# Patient Record
Sex: FEMALE | Employment: UNEMPLOYED | URBAN - METROPOLITAN AREA
[De-identification: names, ages, dates, MRNs, and addresses within clinical notes are randomized per-mention and may not be internally consistent; named-entity substitution may affect disease eponyms.]

---

## 2017-03-22 ENCOUNTER — HOSPITAL ENCOUNTER (INPATIENT)
Facility: HOSPITAL | Age: 47
LOS: 5 days | Discharge: PSYCHIATRIC OR SUBSTANCE USE FACILITY | DRG: 917 | End: 2017-03-27
Attending: INTERNAL MEDICINE | Admitting: INTERNAL MEDICINE
Payer: MEDICAID

## 2017-03-22 DIAGNOSIS — G93.40 ACUTE ENCEPHALOPATHY: Primary | ICD-10-CM

## 2017-03-22 DIAGNOSIS — R41.82 ALTERED MENTAL STATUS: ICD-10-CM

## 2017-03-22 DIAGNOSIS — R40.2430 GLASGOW COMA SCALE TOTAL SCORE 3-8, UNSPECIFIED TIME: ICD-10-CM

## 2017-03-22 LAB
ALBUMIN SERPL BCP-MCNC: 3 G/DL
ALLENS TEST: ABNORMAL
ALP SERPL-CCNC: 58 U/L
ALT SERPL W/O P-5'-P-CCNC: 10 U/L
AMMONIA PLAS-SCNC: 20 UMOL/L
ANION GAP SERPL CALC-SCNC: 10 MMOL/L
AST SERPL-CCNC: 14 U/L
BACTERIA #/AREA URNS HPF: ABNORMAL /HPF
BASOPHILS # BLD AUTO: 0.01 K/UL
BASOPHILS NFR BLD: 0.1 %
BILIRUB SERPL-MCNC: 1.1 MG/DL
BILIRUB UR QL STRIP: NEGATIVE
BUN SERPL-MCNC: 6 MG/DL
CALCIUM SERPL-MCNC: 8 MG/DL
CHLORIDE SERPL-SCNC: 105 MMOL/L
CLARITY UR: ABNORMAL
CO2 SERPL-SCNC: 19 MMOL/L
COLOR UR: YELLOW
CREAT SERPL-MCNC: 0.9 MG/DL
DELSYS: ABNORMAL
DIFFERENTIAL METHOD: ABNORMAL
EOSINOPHIL # BLD AUTO: 0 K/UL
EOSINOPHIL NFR BLD: 0 %
ERYTHROCYTE [DISTWIDTH] IN BLOOD BY AUTOMATED COUNT: 12.4 %
ERYTHROCYTE [SEDIMENTATION RATE] IN BLOOD BY WESTERGREN METHOD: 16 MM/H
EST. GFR  (AFRICAN AMERICAN): >60 ML/MIN/1.73 M^2
EST. GFR  (NON AFRICAN AMERICAN): >60 ML/MIN/1.73 M^2
FIO2: 100
GLUCOSE SERPL-MCNC: 232 MG/DL
GLUCOSE UR QL STRIP: ABNORMAL
HCO3 UR-SCNC: 25 MMOL/L (ref 24–28)
HCT VFR BLD AUTO: 34.5 %
HGB BLD-MCNC: 11.8 G/DL
HGB UR QL STRIP: ABNORMAL
KETONES UR QL STRIP: ABNORMAL
LEUKOCYTE ESTERASE UR QL STRIP: NEGATIVE
LYMPHOCYTES # BLD AUTO: 0.6 K/UL
LYMPHOCYTES NFR BLD: 5.7 %
MCH RBC QN AUTO: 30.1 PG
MCHC RBC AUTO-ENTMCNC: 34.2 %
MCV RBC AUTO: 88 FL
MICROSCOPIC COMMENT: ABNORMAL
MIN VOL: 8.59
MODE: ABNORMAL
MONOCYTES # BLD AUTO: 0.2 K/UL
MONOCYTES NFR BLD: 2.2 %
NEUTROPHILS # BLD AUTO: 9.7 K/UL
NEUTROPHILS NFR BLD: 91.7 %
NITRITE UR QL STRIP: NEGATIVE
PCO2 BLDA: 41.2 MMHG (ref 35–45)
PEEP: 5
PH SMN: 7.39 [PH] (ref 7.35–7.45)
PH UR STRIP: 6 [PH] (ref 5–8)
PIP: 32
PLATELET # BLD AUTO: 235 K/UL
PMV BLD AUTO: 10.2 FL
PO2 BLDA: 548 MMHG (ref 80–100)
POC BE: 0 MMOL/L
POC SATURATED O2: 100 % (ref 95–100)
POC TCO2: 26 MMOL/L (ref 23–27)
POTASSIUM SERPL-SCNC: 3.7 MMOL/L
PROT SERPL-MCNC: 6.5 G/DL
PROT UR QL STRIP: ABNORMAL
RBC # BLD AUTO: 3.92 M/UL
RBC #/AREA URNS HPF: 10 /HPF (ref 0–4)
SAMPLE: ABNORMAL
SITE: ABNORMAL
SODIUM SERPL-SCNC: 134 MMOL/L
SP GR UR STRIP: >=1.03 (ref 1–1.03)
SP02: 100
TROPONIN I SERPL DL<=0.01 NG/ML-MCNC: <0.006 NG/ML
TSH SERPL DL<=0.005 MIU/L-ACNC: 0.83 UIU/ML
URATE CRY URNS QL MICRO: ABNORMAL
URN SPEC COLLECT METH UR: ABNORMAL
UROBILINOGEN UR STRIP-ACNC: 1 EU/DL
VALPROATE SERPL-MCNC: <12.5 UG/ML
VT: 450
WBC # BLD AUTO: 10.57 K/UL
YEAST URNS QL MICRO: ABNORMAL

## 2017-03-22 PROCEDURE — 25000003 PHARM REV CODE 250: Performed by: STUDENT IN AN ORGANIZED HEALTH CARE EDUCATION/TRAINING PROGRAM

## 2017-03-22 PROCEDURE — 87040 BLOOD CULTURE FOR BACTERIA: CPT | Mod: 59

## 2017-03-22 PROCEDURE — 80183 DRUG SCRN QUANT OXCARBAZEPIN: CPT

## 2017-03-22 PROCEDURE — 27000221 HC OXYGEN, UP TO 24 HOURS

## 2017-03-22 PROCEDURE — 36600 WITHDRAWAL OF ARTERIAL BLOOD: CPT

## 2017-03-22 PROCEDURE — 94002 VENT MGMT INPAT INIT DAY: CPT

## 2017-03-22 PROCEDURE — 80164 ASSAY DIPROPYLACETIC ACD TOT: CPT

## 2017-03-22 PROCEDURE — 80053 COMPREHEN METABOLIC PANEL: CPT

## 2017-03-22 PROCEDURE — 84443 ASSAY THYROID STIM HORMONE: CPT

## 2017-03-22 PROCEDURE — 85025 COMPLETE CBC W/AUTO DIFF WBC: CPT

## 2017-03-22 PROCEDURE — 25000003 PHARM REV CODE 250: Performed by: HOSPITALIST

## 2017-03-22 PROCEDURE — 87086 URINE CULTURE/COLONY COUNT: CPT

## 2017-03-22 PROCEDURE — 84484 ASSAY OF TROPONIN QUANT: CPT

## 2017-03-22 PROCEDURE — 99900035 HC TECH TIME PER 15 MIN (STAT)

## 2017-03-22 PROCEDURE — 86703 HIV-1/HIV-2 1 RESULT ANTBDY: CPT

## 2017-03-22 PROCEDURE — 81000 URINALYSIS NONAUTO W/SCOPE: CPT

## 2017-03-22 PROCEDURE — 11000001 HC ACUTE MED/SURG PRIVATE ROOM

## 2017-03-22 PROCEDURE — 36415 COLL VENOUS BLD VENIPUNCTURE: CPT

## 2017-03-22 PROCEDURE — 94761 N-INVAS EAR/PLS OXIMETRY MLT: CPT

## 2017-03-22 PROCEDURE — 25000003 PHARM REV CODE 250: Performed by: INTERNAL MEDICINE

## 2017-03-22 PROCEDURE — 82803 BLOOD GASES ANY COMBINATION: CPT

## 2017-03-22 PROCEDURE — 86592 SYPHILIS TEST NON-TREP QUAL: CPT

## 2017-03-22 PROCEDURE — 93005 ELECTROCARDIOGRAM TRACING: CPT

## 2017-03-22 PROCEDURE — 82140 ASSAY OF AMMONIA: CPT

## 2017-03-22 PROCEDURE — 27200966 HC CLOSED SUCTION SYSTEM

## 2017-03-22 RX ORDER — IBUPROFEN 200 MG
24 TABLET ORAL
Status: DISCONTINUED | OUTPATIENT
Start: 2017-03-22 | End: 2017-03-28 | Stop reason: HOSPADM

## 2017-03-22 RX ORDER — GLUCAGON 1 MG
1 KIT INJECTION
Status: DISCONTINUED | OUTPATIENT
Start: 2017-03-22 | End: 2017-03-28 | Stop reason: HOSPADM

## 2017-03-22 RX ORDER — HEPARIN SODIUM 5000 [USP'U]/ML
5000 INJECTION, SOLUTION INTRAVENOUS; SUBCUTANEOUS EVERY 8 HOURS
Status: DISCONTINUED | OUTPATIENT
Start: 2017-03-22 | End: 2017-03-24

## 2017-03-22 RX ORDER — CHLORHEXIDINE GLUCONATE ORAL RINSE 1.2 MG/ML
15 SOLUTION DENTAL 2 TIMES DAILY
Status: DISCONTINUED | OUTPATIENT
Start: 2017-03-22 | End: 2017-03-28 | Stop reason: HOSPADM

## 2017-03-22 RX ORDER — IBUPROFEN 200 MG
16 TABLET ORAL
Status: DISCONTINUED | OUTPATIENT
Start: 2017-03-22 | End: 2017-03-28 | Stop reason: HOSPADM

## 2017-03-22 RX ORDER — SODIUM CHLORIDE 450 MG/100ML
INJECTION, SOLUTION INTRAVENOUS CONTINUOUS
Status: DISCONTINUED | OUTPATIENT
Start: 2017-03-22 | End: 2017-03-25

## 2017-03-22 RX ADMIN — SODIUM CHLORIDE: 0.45 INJECTION, SOLUTION INTRAVENOUS at 11:03

## 2017-03-22 RX ADMIN — CHLORHEXIDINE GLUCONATE 15 ML: 1.2 RINSE ORAL at 09:03

## 2017-03-22 RX ADMIN — HEPARIN SODIUM 5000 UNITS: 5000 INJECTION, SOLUTION INTRAVENOUS; SUBCUTANEOUS at 09:03

## 2017-03-23 LAB
ALBUMIN SERPL BCP-MCNC: 2.8 G/DL
ALLENS TEST: ABNORMAL
ALP SERPL-CCNC: 51 U/L
ALT SERPL W/O P-5'-P-CCNC: 10 U/L
ANION GAP SERPL CALC-SCNC: 8 MMOL/L
AST SERPL-CCNC: 13 U/L
BASOPHILS # BLD AUTO: 0.01 K/UL
BASOPHILS NFR BLD: 0.1 %
BILIRUB SERPL-MCNC: 0.9 MG/DL
BUN SERPL-MCNC: 5 MG/DL
CALCIUM SERPL-MCNC: 8.2 MG/DL
CHLORIDE SERPL-SCNC: 106 MMOL/L
CK SERPL-CCNC: 111 U/L
CO2 SERPL-SCNC: 22 MMOL/L
CREAT SERPL-MCNC: 0.7 MG/DL
DELSYS: ABNORMAL
DIFFERENTIAL METHOD: ABNORMAL
EOSINOPHIL # BLD AUTO: 0 K/UL
EOSINOPHIL NFR BLD: 0.1 %
ERYTHROCYTE [DISTWIDTH] IN BLOOD BY AUTOMATED COUNT: 12.4 %
ERYTHROCYTE [SEDIMENTATION RATE] IN BLOOD BY WESTERGREN METHOD: 16 MM/H
EST. GFR  (AFRICAN AMERICAN): >60 ML/MIN/1.73 M^2
EST. GFR  (NON AFRICAN AMERICAN): >60 ML/MIN/1.73 M^2
FIO2: 40
GLUCOSE SERPL-MCNC: 118 MG/DL
HCO3 UR-SCNC: 23.1 MMOL/L (ref 24–28)
HCT VFR BLD AUTO: 32 %
HGB BLD-MCNC: 10.9 G/DL
HIV 1+2 AB+HIV1 P24 AG SERPL QL IA: NEGATIVE
LYMPHOCYTES # BLD AUTO: 0.7 K/UL
LYMPHOCYTES NFR BLD: 5.9 %
MCH RBC QN AUTO: 30.2 PG
MCHC RBC AUTO-ENTMCNC: 34.1 %
MCV RBC AUTO: 89 FL
MIN VOL: 8.37
MODE: ABNORMAL
MONOCYTES # BLD AUTO: 1.2 K/UL
MONOCYTES NFR BLD: 10.4 %
NEUTROPHILS # BLD AUTO: 9.5 K/UL
NEUTROPHILS NFR BLD: 83.2 %
PCO2 BLDA: 37.1 MMHG (ref 35–45)
PEEP: 5
PH SMN: 7.4 [PH] (ref 7.35–7.45)
PIP: 28
PLATELET # BLD AUTO: 221 K/UL
PMV BLD AUTO: 9.8 FL
PO2 BLDA: 172 MMHG (ref 80–100)
POC BE: -2 MMOL/L
POC SATURATED O2: 100 % (ref 95–100)
POC TCO2: 24 MMOL/L (ref 23–27)
POCT GLUCOSE: 128 MG/DL (ref 70–110)
POTASSIUM SERPL-SCNC: 3.8 MMOL/L
PROT SERPL-MCNC: 6 G/DL
RBC # BLD AUTO: 3.61 M/UL
RPR SER QL: NORMAL
SAMPLE: ABNORMAL
SITE: ABNORMAL
SODIUM SERPL-SCNC: 136 MMOL/L
SP02: 100
VT: 400
WBC # BLD AUTO: 11.46 K/UL

## 2017-03-23 PROCEDURE — 95822 EEG COMA OR SLEEP ONLY: CPT

## 2017-03-23 PROCEDURE — 99900035 HC TECH TIME PER 15 MIN (STAT)

## 2017-03-23 PROCEDURE — 85025 COMPLETE CBC W/AUTO DIFF WBC: CPT

## 2017-03-23 PROCEDURE — 36600 WITHDRAWAL OF ARTERIAL BLOOD: CPT

## 2017-03-23 PROCEDURE — 87070 CULTURE OTHR SPECIMN AEROBIC: CPT

## 2017-03-23 PROCEDURE — 63600175 PHARM REV CODE 636 W HCPCS: Performed by: STUDENT IN AN ORGANIZED HEALTH CARE EDUCATION/TRAINING PROGRAM

## 2017-03-23 PROCEDURE — 93005 ELECTROCARDIOGRAM TRACING: CPT

## 2017-03-23 PROCEDURE — 87205 SMEAR GRAM STAIN: CPT

## 2017-03-23 PROCEDURE — 82550 ASSAY OF CK (CPK): CPT

## 2017-03-23 PROCEDURE — 80053 COMPREHEN METABOLIC PANEL: CPT

## 2017-03-23 PROCEDURE — 11000001 HC ACUTE MED/SURG PRIVATE ROOM

## 2017-03-23 PROCEDURE — 82693 ASSAY OF ETHYLENE GLYCOL: CPT

## 2017-03-23 PROCEDURE — 80320 DRUG SCREEN QUANTALCOHOLS: CPT

## 2017-03-23 PROCEDURE — 25000003 PHARM REV CODE 250: Performed by: INTERNAL MEDICINE

## 2017-03-23 PROCEDURE — 82803 BLOOD GASES ANY COMBINATION: CPT

## 2017-03-23 PROCEDURE — 25000003 PHARM REV CODE 250: Performed by: HOSPITALIST

## 2017-03-23 PROCEDURE — 95819 EEG AWAKE AND ASLEEP: CPT | Mod: 26,,, | Performed by: PSYCHIATRY & NEUROLOGY

## 2017-03-23 PROCEDURE — 25000003 PHARM REV CODE 250: Performed by: STUDENT IN AN ORGANIZED HEALTH CARE EDUCATION/TRAINING PROGRAM

## 2017-03-23 PROCEDURE — 36415 COLL VENOUS BLD VENIPUNCTURE: CPT

## 2017-03-23 RX ORDER — HALOPERIDOL 5 MG/ML
2.5 INJECTION INTRAMUSCULAR EVERY 4 HOURS PRN
Status: DISCONTINUED | OUTPATIENT
Start: 2017-03-23 | End: 2017-03-25

## 2017-03-23 RX ORDER — FENTANYL CITRATE 50 UG/ML
25 INJECTION, SOLUTION INTRAMUSCULAR; INTRAVENOUS ONCE
Status: COMPLETED | OUTPATIENT
Start: 2017-03-23 | End: 2017-03-23

## 2017-03-23 RX ORDER — FAMOTIDINE 10 MG/ML
20 INJECTION INTRAVENOUS 2 TIMES DAILY
Status: DISCONTINUED | OUTPATIENT
Start: 2017-03-23 | End: 2017-03-28 | Stop reason: HOSPADM

## 2017-03-23 RX ORDER — FENTANYL CITRATE 50 UG/ML
INJECTION, SOLUTION INTRAMUSCULAR; INTRAVENOUS
Status: DISPENSED
Start: 2017-03-23 | End: 2017-03-24

## 2017-03-23 RX ADMIN — HEPARIN SODIUM 5000 UNITS: 5000 INJECTION, SOLUTION INTRAVENOUS; SUBCUTANEOUS at 03:03

## 2017-03-23 RX ADMIN — SODIUM CHLORIDE: 0.45 INJECTION, SOLUTION INTRAVENOUS at 03:03

## 2017-03-23 RX ADMIN — FAMOTIDINE 20 MG: 10 INJECTION, SOLUTION INTRAVENOUS at 09:03

## 2017-03-23 RX ADMIN — FAMOTIDINE 20 MG: 10 INJECTION, SOLUTION INTRAVENOUS at 10:03

## 2017-03-23 RX ADMIN — HEPARIN SODIUM 5000 UNITS: 5000 INJECTION, SOLUTION INTRAVENOUS; SUBCUTANEOUS at 05:03

## 2017-03-23 RX ADMIN — FENTANYL CITRATE 25 MCG: 50 INJECTION, SOLUTION INTRAMUSCULAR; INTRAVENOUS at 01:03

## 2017-03-23 RX ADMIN — CHLORHEXIDINE GLUCONATE 15 ML: 1.2 RINSE ORAL at 10:03

## 2017-03-23 RX ADMIN — CHLORHEXIDINE GLUCONATE 15 ML: 1.2 RINSE ORAL at 09:03

## 2017-03-23 NOTE — PLAN OF CARE
"Pt intubated; no family available. Progress notes reviewed  47 y.o. AA female with Crohn's, depression, and drug abuse who was transferred from [facility] ED. History obtained via OSH records. I have been unable to reach family. Pt presented 3/22/17 to above ED at 10:03AM after being found unconscious in bed at home. Family thinks patient may have taken an overdose of depression medication. They had last spoken to patient around 8:17am, but even then pt seemed "a lot different & weak."Apparently, last dose of Invega given 3/10/17, per [facility] (mental health).     [place] to follow.       03/23/17 1109   Discharge Assessment   Assessment Type Discharge Planning Assessment   Confirmed/corrected address and phone number on facesheet? No  (pt intubated; no family at bedside)   Assessment information obtained from? Medical Record   Prior to hospitilization cognitive status: Alert/Oriented   Prior to hospitalization functional status: Independent   Current cognitive status: Coma/Sedated/Intubated   Current Functional Status: Completely Dependent   Arrived From acute care hospital  (transfer from [facility])   Able to Return to Prior Arrangements unable to determine at this time (comments)   Is patient able to care for self after discharge? Unable to determine at this time (comments)   Readmission Within The Last 30 Days unable to assess   Patient currently being followed by outpatient case management? Unable to determine (comments)   Do you have any problems affording any of your prescribed medications? TBD   Are there any open cases? No   Discharge Plan A (TBD)     "

## 2017-03-23 NOTE — CONSULTS
[facility] Neurology Consult Note    Reason for Consult -   Stroke    History of Present Illness -   Patient is a 47 year old woman with PMH of Chrohn's, Depression, Bipolar disorder, Paranoid Schizophrenia, and multiple suicide attempts, and headache who presented to the ED on 3/22 after being found unconscious in bed at home.      In discussing with family, family reports that the patient has history of suicide attempts.  Once took multiple pills and then called a family member.  Later she attempted to walk into the [facility] but was stopped by family members.    On initial exam, patient is initially unresponsive even to noxious stimuli to limbs, but moves her head when corneal reflex is attempted and moves her extremities and opens her eyes when gag and cough are elicited with deep suction.  Patient's nurse also reports that patient would not remain still in imaging until she received a dose of fentanyl.    Review of Systems -  Unable to obtain 2/2 patient presentation    Past Medical History -   Chrohn's, Depression, Bipolar disorder, Paranoid Schizophrenia, and multiple suicide attempts, and headache    Social History -   Social History     Social History    Marital status: Unknown     Spouse name: N/A    Number of children: N/A    Years of education: N/A     Social History Main Topics    Smoking status: Not on file    Smokeless tobacco: Not on file    Alcohol use Not on file    Drug use: Not on file    Sexual activity: Not on file     Other Topics Concern    Not on file     Social History Narrative     Family History -   No family history on file.    Allergies - NKDA  Home Neuro Medications -     Vitals -     Vitals:    03/23/17 1400 03/23/17 1415 03/23/17 1430 03/23/17 1521   BP: (!) 142/69      Patient Position: Lying      BP Method: Automatic      Pulse: 104 96 92 109   Resp: (!) 33 17 17 (!) 23   Temp:       TempSrc:       SpO2:  100% 100% 100%   Weight:       Height:           Neurological Exam  -     General appearance: Patient intubated; not on continuous sedation  Facial Expression: normal       Affect: full       Orientation to time & place:  Not participating with exam        Cranial nerves:  pupils equal round and reactive, corneals intact, oculocephalics intact, gag and eye closure intact    Musculoskeletal:  Muscle tone: all 4 extremities normal        Muscle Bulk: all 4 extremities normal        Muscle strength:  5/5 in bilateral upper and lower extremities in proximal and distal flexion and extension; (patient not moving when requested, but moving all extremities on deep suction  Deep tendon Reflexes: 2+ bilateral triceps, biceps, and brachioradialis; 2+ in bilateral patellae. Positive ankle jerks.  Plantar flexor responses bilaterally    Labs -      Recent Labs  Lab 03/22/17  1938 03/23/17  0325   WBC 10.57 11.46   HGB 11.8* 10.9*   HCT 34.5* 32.0*    221   * 136   K 3.7 3.8    106   CO2 19* 22*   BUN 6 5*   * 118*   PROT 6.5 6.0   ALBUMIN 3.0* 2.8*   BILITOT 1.1* 0.9   AST 14 13   ALKPHOS 58 51*   ALT 10 10   TROPONINI <0.006  --    TSH 0.834  --        Imaging -     MRI Brain  Time of Procedure: 03/23/17 12:39:42  Accession # [id number]    Procedure: MRI the brain without contrast.    Technique: Multiplanar, multisequence MRI of the brain was obtained without the use of intravenous contrast.    Comparison: None    Findings: The brain parenchyma is normal in signal and contour. There are no abnormal areas of brain parenchymal signal. There are no areas of restricted diffusion to suggest acute infarction. The ventricles are normal in size and configuration without evidence for hydrocephalus. There are no abnormal areas of the gradient susceptibility to suggest parenchymal hemorrhage. No abnormal intra or extra axial fluid collections. The major intracranial T2  flow-voids are present.    The globes, orbits, pituitary gland, pineal gland, craniocervical junction, and upper  cervical spine demonstrate no significant abnormalities.  The paranasal sinuses and mastoid air cells are clear.   Impression        No evidence of acute infarction, hemorrhage, mass, or hydrocephalus.      Electronically signed by: CRISTO DUTTON MD  Date: 03/23/17  Time: 14:02      EEG  DATE OF STUDY: 03/23/2017     EEG NUMBER: OK-.     REFERRING PHYSICIAN: Dr. Blount.     This EEG was performed to assess for subclinical seizures.     ELECTROENCEPHALOGRAM REPORT     METHODOLOGY: Electroencephalographic (EEG) recording is recorded with   electrodes placed according to the International 10-20 placement system. Thirty  two (32) channels of digital signal (sampling rate of 512/sec), including T1   and T2, were simultaneously recorded from the scalp and may include EKG, EMG,   and/or eye monitors. Recording band pass was 0.1 to 512 Hz. Digital video   recording of the patient is simultaneously recorded with the EEG. The patient   is instructed to report clinical symptoms which may occur during the recording   session. EEG and video recording are stored and archived in digital format.   Activation procedures, which include photic stimulation, hyperventilation and   instructing patients to perform simple tasks, are done in selected patients.     The EEG is displayed on a monitor screen and can be reviewed using different   montages. Computer assisted-analysis is employed to detect spike and   electrographic seizure activity. The entire record is submitted for computer   analysis. The entire recording is visually reviewed, and the times identified   by computer analysis as being spikes or seizures are reviewed again.     Compressed spectral analysis (CSA) is also performed on the activity recorded   from each individual channel. This is displayed as a power display of   frequencies from 0 to 30 Hz over time. The CSA is reviewed looking for   asymmetries in power between homologous areas of the scalp, then compared  with   the original EEG recording.     [employer] software was also utilized in the review of this study. This software   suite analyzes the EEG recording in multiple domains. Coherence and rhythmicity  are computed to identify EEG sections which may contain organized seizures.   Each channel undergoes analysis to detect the presence of spike and sharp waves   which have special and morphological characteristics of epileptic activity. The  routine EEG recording is converted from special into frequency domain. This is  then displayed comparing homologous areas to identify areas of significant   asymmetry. Algorithm to identify non-cortically generated artifact is used to   separate artifact from the EEG.     EEG FINDINGS: The recording was obtained with a number of standard bipolar and   referential montages during lethargic state and sleep. In this state, the   background was diffusely disorganized with a nonsustained posterior dominant   rhythm of 8 Hz, which was symmetric. Wakefulness was predominantly   characterized by an admixture of frequencies. During drowsiness, the background  rhythm waxed and waned and there were periods of slowing. During deeper stages  of clinical sleep, symmetric sleep spindles were noted. There were no   interictal epileptiform abnormalities and no clinical or electrographic seizures  were recorded. Activation procedures were not performed.     The EKG channel revealed an irregular rhythm.     IMPRESSION: This is an abnormal EEG during lethargic state and sleep. Diffuse   disorganized slowing of the background was noted.     CLINICAL CORRELATION: The patient is a 47-year-old female with a history of   multiple medical problems who presented with altered sensorium. She is   currently maintained on Trileptal. This is an abnormal EEG during lethargic   state and sleep. The overall degree of slowing and disorganization for given   age is suggestive of a mild-to-moderate degree of  encephalopathy, nonspecific to  the cause. There is no evidence of an epileptic process on this recording. No  seizures were recorded during this study.        FAK/SN dd: 03/23/2017 10:55:44 (CDT) td: 03/23/2017 11:31:43 (CDT) Doc ID   #[id number] Job ID #[id number]       Assessment and Plan -     Patient is a 47 year old woman with PMH of Chrohn's, Depression, Bipolar disorder, Paranoid Schizophrenia, and multiple suicide attempts, and headache who presented to the ED on 3/22 after being found unconscious in bed at home.    -Hx of suicide attempts including overdose and then calling family member and attempting to walk into [facility] but stopped by family members  -Patient demonstrates intact eyelid strength, pupillary responses, corneal reflexes, gag and cough  -Patient demonstrated volitional actions including attempting to maintain her eyes closed, turning her head to avoid exam, and moving arms and legs following deep suction  -Patient also reportedly demonstrating anxiety and moving when imaging attempted  -No acute abnormalities on MRI  -EEG shows no epileptiform activity; discussed with Dr. Raza; although EEG shows some diffuse slowing, it does not explain patient's presentation  -Per family, no history of seizure; patient possibly on Trileptal for headaches  -Recommend consulting psychiatry  -No further Neurology recommendations    Thank you for the consult.  We will sign off.  Please contact us if we can be of further assistance.  Discussed with Dr. Ry Johnson MD  [facility] Neu[facility]

## 2017-03-23 NOTE — PROGRESS NOTES
Results for SALVATORE HOWELL (MRN [id number]) as of 3/23/2017 05:41   Ref. Range 3/22/2017 20:00   POC PH Latest Ref Range: 7.35 - 7.45  7.391   POC PCO2 Latest Ref Range: 35 - 45 mmHg 41.2   POC PO2 Latest Ref Range: 80 - 100 mmHg 548 (H)   POC BE Latest Ref Range: -2 to 2 mmol/L 0   POC HCO3 Latest Ref Range: 24 - 28 mmol/L 25.0   POC SATURATED O2 Latest Ref Range: 95 - 100 % 100   POC TCO2 Latest Ref Range: 23 - 27 mmol/L 26   FiO2 Unknown 100   Vt Unknown 450   PiP Unknown 32   PEEP Unknown 5   Sample Unknown ARTERIAL   DelSys Unknown Adult Vent   Allens Test Unknown Pass   Site Unknown LR   Mode Unknown AC/PRVC     Results shared with EICU physician.  Ventilator settings provided.

## 2017-03-23 NOTE — H&P
"[facility] Medicine History and Physical - Resident Note    Admitting Team: [facility] Medicine Team B  Attending Physician: Dr Adames  Resident: Dr Jeanette Best  Intern: Dr Paulino Baker  Date of Admit: 3/22/2017    Chief Complaint     AMS x 1 d    Subjective:      History of Present Illness:  Silvana Lopes is a 47 y.o. AA female with Crohn's, depression, and drug abuse who was transferred from [facility] ED. History obtained via OSH records. I have been unable to reach family. Pt presented 3/22/17 to above ED at 10:03AM after being found unconscious in bed at home. Family thinks patient may have taken an overdose of depression medication. They had last spoken to patient around 8:17am, but even then pt seemed "a lot different & weak."    Given Narcan 2 mg via EMS without response. Her ED triage vitals were temp 97.4, /85 (BP as low as 90/56, which improved), , RR 12, 99% on RA.  She received 3 boluses of 1 liter NS in OSH ED, and started on D51/2NS at 125 ml/hr. Pupils reportedly pinpoint. GCS 7 (2 eye opening to pain, 1 non-verbal, 4 withdraws). Just prior to transport here, she began vomiting, so she was intubated for airway protection.    Apparently, last dose of Invega given 3/10/17, per [facility] (mental health).     Past Medical History:  As above     Past Surgical History:  Unknown     Allergies:  Unknown     Home Medications:  Imuran 50mg daily  Remicade  mg - dose unknown   Metoprolol succinate 25 mg daily  Zyprexa 15 mg qhs  Trileptal 300 mg BID  Invega Sustenna 156 mg/ml IM every 4 weeks - dose unknown    Family History:  Unknown     Social History:  Unknown     Review of Systems:  Unable to assess due to AMS    Health Maintaince :   Primary Care Physician: Gilmar Blanco  Immunizations:   Unknown   Cancer Screening:  Unknown      Objective:   Last 24 Hour Vital Signs:  BP  Min: 127/72  Max: 127/72  Pulse  Av  Min: 83  Max: 88  Resp  Av.3  Min: 16  " Max: 17  SpO2  Av %  Min: 100 %  Max: 100 %  There is no height or weight on file to calculate BMI.       Physical Examination:  GEN: intubated, moves extremities with stimulation but does not open eyes  HEENT: NCAT, PERRL, ETT in place  CV: RRR, no m/r  RESP: CTAB anterior fields, vent sounds, intubated  ABD: soft, NTND, normoactive BS, no organomegaly  EXTR: no c/c/e, 2+ distal pulses x 4  NEURO: PERRL, not alert (not on sedation), moves all extremities with stimulation but does not open eyes, does not follow commands  SKIN: no rashes, lesions, or color changes       Laboratory:  OSH results  WBC 7.5 (81% segs, 9% lymphs, 8% monos, 0 bands)  H/H 13.5/40.4    Gluc 188  BUN 5  Cr 1  Na 136  K 3.1  Cl 102  HCO3 25  Ca 8.7  ALP, ALT, AST normal  Tbili 1.2  INR 1  UA: clear, 15 ketones, negative for nitrite/leukocytes, 2-4 wbc, 2-4 rbc, few bacteria, no casts  Tylenol level negative  EtOH negative  Salicylate negative  Troponin negative  Tox screen negative  ABG: ph 7.42 / pco2 34 / po2 93 / bicarb 22 / o2 sat 97% (on room air)    Other Results:  EKG (my interpretation): NSR, no acute changes    Radiology:  OSH CT head - no bleed, per report  CXR - negative     Assessment:     Silvana Lopes is a 47 y.o. female with:  Patient Active Problem List    Diagnosis Date Noted    Acute encephalopathy 2017        Plan:     Acute encephalopathy  -GCS 7, etiology unclear at this time. Concern for psychiatric medication overdose. No obvious evidence of metabolic or infectious causes at this time. OSH CT head without bleed. Tox screen, Tylenol, and salicylate level negative at OSH.   -poison control center contacted by OSH, recommended supportive care  -will pursue infectious workup, check ammonia, order brain MRI, consult psychiatry & neurology, check oxcarbazepine level, get hourly neuro checks, order stat EEG  -intubated for airway protection, not requiring any sedation. was never in respiratory distress.  pulm consult for vent management. abg pending, will adjust settings accordingly     Hx of drug abuse  -unknown which drugs patient abuses  -tox screen at OSH was negative    Crohn's  -holding home medications for now    Psychiatric history  -diagnosis unknown  -holding home zyprexa, last invega injection 3/10/17  -psych consult    Diet-NPO  PPX-heparin  Dispo-intubated, continue ICU care    Family Contact:  Latha Gonzalez (sister) - [phone]  Home phone - [phone]    Code Status:     Full     Jeanette Best  [facility] Internal Medicine HO-III  [facility] Medicine Service B  [facility] Medicine Hospitalist Pager numbers:   [facility] Hospitalist Medicine Team A (Cyndy/Cristobal): [phone]  [facility] Hospitalist Medicine Team B (Farzana/Zacarias):  [phone]

## 2017-03-23 NOTE — PLAN OF CARE
Problem: Patient Care Overview  Goal: Plan of Care Review  Patient admitted to ICU intubated and unconscious.  ETTube 7.0 @ 25 cm right lip.  Will continue to monitor.

## 2017-03-23 NOTE — PROGRESS NOTES
Results for SALVATORE HOWELL (MRN [id number]) as of 3/23/2017 05:41   Ref. Range 3/23/2017 05:09   POC PH Latest Ref Range: 7.35 - 7.45  7.401   POC PCO2 Latest Ref Range: 35 - 45 mmHg 37.1   POC PO2 Latest Ref Range: 80 - 100 mmHg 172 (H)   POC BE Latest Ref Range: -2 to 2 mmol/L -2   POC HCO3 Latest Ref Range: 24 - 28 mmol/L 23.1 (L)   POC SATURATED O2 Latest Ref Range: 95 - 100 % 100   POC TCO2 Latest Ref Range: 23 - 27 mmol/L 24   FiO2 Unknown 40   Vt Unknown 400   PiP Unknown 28   PEEP Unknown 5   Sample Unknown ARTERIAL   DelSys Unknown Adult Vent   Allens Test Unknown Pass   Site Unknown LR   Mode Unknown AC/PRVC       Results given to nurse.

## 2017-03-23 NOTE — PROGRESS NOTES
"[facility] Hospital Medicine Progress Note    Subjective:      Remains intubated. No improvement in mental status. Moving extremities with stimulation but does not open eyes or follow commands.      Objective:   Last 24 Hour Vital Signs:  BP  Min: 107/60  Max: 178/79  Temp  Av.7 °F (36.5 °C)  Min: 97.5 °F (36.4 °C)  Max: 97.8 °F (36.6 °C)  Pulse  Av.8  Min: 71  Max: 110  Resp  Av.6  Min: 15  Max: 57  SpO2  Av %  Min: 98 %  Max: 100 %  Height  Av' 4" (162.6 cm)  Min: 5' 4" (162.6 cm)  Max: 5' 4" (162.6 cm)  Weight  Av kg (174 lb 0.9 oz)  Min: 78.7 kg (173 lb 8 oz)  Max: 79.2 kg (174 lb 9.7 oz)  Body mass index is 29.97 kg/(m^2).  I/O last 3 completed shifts:  In: 486.3 [I.V.:486.3]  Out: 415 [Urine:415]    Physical Examination:  GEN: intubated, moves extremities with stimulation but does not open eyes  HEENT: NCAT, PERRL, ETT in place  CV: RRR, no m/r  RESP: CTAB anterior fields, vent sounds, intubated  ABD: soft, NTND, normoactive BS, no organomegaly  EXTR: no c/c/e, 2+ distal pulses x 4  NEURO: PERRL, not alert (not on sedation), moves all extremities with stimulation but does not open eyes, does not follow commands  SKIN: no rashes, lesions, or color changes       Laboratory Results     WBC 11  Cr 0.7  ABG reviewed  RPR negative  HIV pending  Oxcarbazepine level pending      Other Results:  EKG (my interpretation): NSR, no acute changes    Radiology:  OSH CT head - no bleed, per report  CXR - negative     Assessment:     Silvana Lopes is a 47 y.o. female with:  Patient Active Problem List    Diagnosis Date Noted    Acute encephalopathy 2017        Plan:     Acute encephalopathy  -GCS 7, etiology unclear at this time, without improvement this AM. Concern for psychiatric medication overdose. No obvious evidence of metabolic or infectious causes at this time. OSH CT head x 2 without bleed. Tox screen, Tylenol, and salicylate level negative at OSH. Ammonia level normal. EEG w/o seizure " activity  -poison control center contacted by OSH, recommended supportive care  -will pursue infectious workup, order brain MRI, consult psychiatry & neurology, check oxcarbazepine level, get hourly neuro checks, check volatiles  -intubated for airway protection, not requiring any sedation. was never in respiratory distress. pulm consult for vent management    Crohn's  -holding home medications for now    Bipolar disorder/schizophrenia  -holding home zyprexa, last invega injection 3/10/17  -psych consult    Diet-NPO  PPX-heparin  Dispo-intubated, continue ICU care    Family Contact:  Latha Gonzalez (sister) - [phone]  Home phone - [phone]  Fiorella (sister) - [phone]    Code Status:     Elizabeth Best  [facility] Internal Medicine HO-III  [facility] Medicine Service B  [facility] Medicine Hospitalist Pager numbers:   [facility] Hospitalist Medicine Team A (Cyndy/Cristobal): [phone]  [facility] Hospitalist Medicine Team B (Farzana/Zacarias):  [phone]

## 2017-03-23 NOTE — PLAN OF CARE
[facility] Internal Medicine    Spoke with Poison Control who are familiar with patient and current condition. Per recommendations EKG, CK ordered. MRI pending. oxcarbazepine level pending. EEG ordered. Per poison control lower suspicion for overdose, suspect underlying medical etiology. Recommend supportive care at this time, follow up on pending studies.    Lenard Yañez MD PGY1

## 2017-03-23 NOTE — PLAN OF CARE
Notified Dr. Adames's team about not able to do STAT EEG here, if need STAT EEG, need to transfer pt to main campus.

## 2017-03-23 NOTE — PLAN OF CARE
Problem: Patient Care Overview  Goal: Plan of Care Review  Outcome: Ongoing (interventions implemented as appropriate)  EEG monitoring and MRI performed today. Results unremarkable and show no unusual neuro activity. Neurology consulted. Pt reflexes intact. See note for further details. Changes in vital signs when patient agitated and stimulated. HR >120. RR> 20. Psych consulted. Pt opens eyes spontaneously and moves all extremities purposefully, but not appropriately following commands. MIVF currently infusing. Pt and family updated on POC. Safety measures maintained.     Problem: Fall Risk (Adult)  Goal: Absence of Falls  Patient will demonstrate the desired outcomes by discharge/transition of care.   Outcome: Ongoing (interventions implemented as appropriate)  Pt reamains free of falls. Safety measures maintained. Hourly rounding performed. Room near unit station.     Problem: Pressure Ulcer Risk (Moris Scale) (Adult,Obstetrics,Pediatric)  Goal: Skin Integrity  Patient will demonstrate the desired outcomes by discharge/transition of care.   Outcome: Ongoing (interventions implemented as appropriate)  Skin remains intact. Pt repositioned Q2. Pressure points protected. Devices and tubing remain free from under patient.     Problem: Infection, Risk/Actual (Adult)  Goal: Infection Prevention/Resolution  Patient will demonstrate the desired outcomes by discharge/transition of care.   Outcome: Ongoing (interventions implemented as appropriate)  Pt remains free of infection. Labs monitored daily.     Problem: Pain, Acute (Adult)  Goal: Acceptable Pain Control/Comfort Level  Patient will demonstrate the desired outcomes by discharge/transition of care.   Outcome: Ongoing (interventions implemented as appropriate)  Pain assessments performed Q2. Pain medication administered one time dose for extreme agitation during procedure.  Will assess and intervene as needed.

## 2017-03-23 NOTE — PLAN OF CARE
Notified Dr. Adames's team about pt urine out put 30-40 ml/hr. Pt on continuous 1/2NS at 75 ml/hr.

## 2017-03-23 NOTE — PHYSICIAN QUERY
PT Name: Silvana Lopes  MR #: [id number]    Physician Query Form - Neurological Condition Clarification       CDS/: Shannan Funes               Contact information: [phone]    This form is a permanent document in the medical record.     Query Date: March 23, 2017    By submitting this query, we are merely seeking further clarification of documentation. Please utilize your independent clinical judgment when addressing the question(s) below.    The Medical record contains the following:   Indicators   Supporting Clinical Findings Location in Medical Record   X AMS, Confusion, LOC, etc. AMS x 1 d H&P    Acute / Chronic Illness depression, and drug abuse who was transferred  H&P    Radiology Findings      Electrolyte Imbalance     X Medication She received 3 boluses of 1 liter NS in OSH ED, and started on D51/2NS at 125 ml/hr. H&P    Treatment     X Other Acute encephalopathy H&P     Provider, please specify the diagnosis or diagnoses associated with above clinical findings.    [  ] Toxic Encephalopathy    [  ] Other Encephalopathy    [ x ] Unspecified Encephalopathy    [  ] Other (please specify): _____________________________________    [  ] Clinically Undetermined      Please document in your progress notes daily for the duration of treatment until resolved, and  include in your discharge summary.

## 2017-03-23 NOTE — PLAN OF CARE
Pt admitted to ICU from EMS transport. VSS. Dr. Adames's team notified of patient's arrival. Will continue to monitor.

## 2017-03-23 NOTE — PROCEDURES
DATE OF STUDY:  03/23/2017    EEG NUMBER:  OK-.    REFERRING PHYSICIAN:  Dr. Blount.    This EEG was performed to assess for subclinical seizures.    ELECTROENCEPHALOGRAM REPORT    METHODOLOGY:  Electroencephalographic (EEG) recording is recorded with   electrodes placed according to the International 10-20 placement system.  Thirty   two (32) channels of digital signal (sampling rate of 512/sec), including T1   and T2, were simultaneously recorded from the scalp and may include EKG, EMG,   and/or eye monitors.  Recording band pass was 0.1 to 512 Hz.  Digital video   recording of the patient is simultaneously recorded with the EEG.  The patient   is instructed to report clinical symptoms which may occur during the recording   session.  EEG and video recording are stored and archived in digital format.    Activation procedures, which include photic stimulation, hyperventilation and   instructing patients to perform simple tasks, are done in selected patients.    The EEG is displayed on a monitor screen and can be reviewed using different   montages.  Computer assisted-analysis is employed to detect spike and   electrographic seizure activity.  The entire record is submitted for computer   analysis.  The entire recording is visually reviewed, and the times identified   by computer analysis as being spikes or seizures are reviewed again.    Compressed spectral analysis (CSA) is also performed on the activity recorded   from each individual channel.  This is displayed as a power display of   frequencies from 0 to 30 Hz over time.  The CSA is reviewed looking for   asymmetries in power between homologous areas of the scalp, then compared with   the original EEG recording.    [employer] software was also utilized in the review of this study.  This software   suite analyzes the EEG recording in multiple domains.  Coherence and rhythmicity   are computed to identify EEG sections which may contain organized seizures.     Each channel undergoes analysis to detect the presence of spike and sharp waves   which have special and morphological characteristics of epileptic activity.  The   routine EEG recording is converted from special into frequency domain.  This is   then displayed comparing homologous areas to identify areas of significant   asymmetry.  Algorithm to identify non-cortically generated artifact is used to   separate artifact from the EEG.    EEG FINDINGS:  The recording was obtained with a number of standard bipolar and   referential montages during lethargic state and sleep.  In this state, the   background was diffusely disorganized with a nonsustained posterior dominant   rhythm of 8 Hz, which was symmetric.  Wakefulness was predominantly   characterized by an admixture of frequencies.  During drowsiness, the background   rhythm waxed and waned and there were periods of slowing.  During deeper stages   of clinical sleep, symmetric sleep spindles were noted.  There were no   interictal epileptiform abnormalities and no clinical or electrographic seizures   were recorded.  Activation procedures were not performed.    The EKG channel revealed an irregular rhythm.    IMPRESSION:  This is an abnormal EEG during lethargic state and sleep.  Diffuse   disorganized slowing of the background was noted.    CLINICAL CORRELATION:  The patient is a 47-year-old female with a history of   multiple medical problems who presented with altered sensorium.  She is   currently maintained on Trileptal.  This is an abnormal EEG during lethargic   state and sleep.  The overall degree of slowing and disorganization for given   age is suggestive of a mild-to-moderate degree of encephalopathy, nonspecific to   the cause.  There is no evidence of an epileptic process on this recording.  No   seizures were recorded during this study.      FAK/SN  dd: 03/23/2017 10:55:44 (CDT)  td: 03/23/2017 11:31:43 (CDT)  Doc ID   #[id number]  Job ID  #[id number]    CC:

## 2017-03-23 NOTE — CONSULTS
Consult Note  [facility]    SUBJECTIVE     Reason for consult: Mechanical ventilation management     History of Present Illness  History mainly obtained from chart review and sisters (Latha/Rosanna) as patient is intubated. Patient is a 46 yo woman with PMH of Crohn's, depression with h/o SI, Bipolar/Schizo, drug abuse who was transferred from [facility] ED for AMS. Per family, 4 months ago, patient was complaining of headache and generalized weakness which worsened in the last 2 months associated with anorexia. On Tuesday, she had URI symptoms and visited the ED in [facility] in  and was given fluids and discharged from ED without abx. Yesterday around 8AM patient called family to report about headache and feeling week. Family came around 10-11AM and found patient laying unconscious in bed and called EMS, with bottles of Zyprexa/oxacarbazapine bottles filled on 3/6 empty. Patient denies any recent signs of SI from patient but reports she had past SI of jumping into river and intermittent expression of SI. Per sisters, patient recently started on Remicade  2 weeks ago and was noticing low BP with SBP in 70s. On EMS to ED in [facility], she was given narcan x2 without response but reportedly had pinpoint pupils, GCS 7, given 3 bolus of 1L NS and swicthed to D5 1/2NS. Before transport, patient vomitted and was intubated for airway protection. Patient was transferred to [facility].     Patient has been afebrile, -160/60-70, GCS 5, intubated, CBC/C MP unremarkable, ammonia WNL, trop neg, EKG without acute ischemic changes, ABG 7.391/41/548 on 100% FiO2, CXR without acute abnl, MRI and EEG pending. Pulm was consulted for mechanical ventilation management.       PMH as above    No past surgical history on file.    No family history on file.    Social History     Social History    Marital status: Unknown     Spouse name: N/A    Number of children: N/A    Years of education: N/A     Social History Main  Topics    Smoking status: Not on file    Smokeless tobacco: Not on file    Alcohol use Not on file    Drug use: Not on file    Sexual activity: Not on file     Other Topics Concern    Not on file     Social History Narrative       Current Facility-Administered Medications   Medication Dose Route Frequency Provider Last Rate Last Dose    0.45% NaCl infusion   Intravenous Continuous Lenard Yañez MD 75 mL/hr at 03/22/17 2331      chlorhexidine 0.12 % solution 15 mL  15 mL Mouth/Throat BID ANALI Rothman MD   15 mL at 03/23/17 0924    dextrose 50% injection 12.5 g  12.5 g Intravenous PRN Jeanette Best MD        dextrose 50% injection 25 g  25 g Intravenous PRN Jeanette Best MD        famotidine (PF) 20 mg/2 mL injection 20 mg  20 mg Intravenous BID Garry Vargas MD   20 mg at 03/23/17 0924    glucagon (human recombinant) injection 1 mg  1 mg Intramuscular PRN Jeanette Best MD        glucose chewable tablet 16 g  16 g Oral PRN Jeanette Best MD        glucose chewable tablet 24 g  24 g Oral PRN Jeanette Best MD        heparin (porcine) injection 5,000 Units  5,000 Units Subcutaneous Q8H Jeanette Best MD   5,000 Units at 03/23/17 0557       Review of patient's allergies indicates:  Not on File      Review of Systems  As per HPI, otherwise negative.     OBJECTIVE:     Vital Signs (Most Recent)  Vitals:    03/23/17 1000   BP: (!) 151/73   Pulse: 91   Resp: (!) 21   Temp:        Physical Exam  GEN: intubated, responsive to pain stimuli  HENT: NCAT, PERRLA, ET tube in place, no LAD  Lungs: mechanical breath sounds  CV: RRR,  No murmurs/gallops appreciated  Abd: soft, non-distended, active BS  Extremities: no cyanosis/edema   Skin: no active lesions/rashes/urticaria   Neuro: response to sternal rub, moving all extremities when stimulated, non-responsive to voice, no clonus, limited neuro exam    Laboratory  No results for input(s): INR in the last 168 hours.    Recent Labs  Lab 03/22/17  9284  03/23/17  0325   WBC 10.57 11.46   HGB 11.8* 10.9*   HCT 34.5* 32.0*    221     Lab Results   Component Value Date    TSH 0.834 03/22/2017     Recent Labs  Lab 03/22/17 1938 03/23/17  0325   * 136   K 3.7 3.8    106   CO2 19* 22*   BUN 6 5*   CREATININE 0.9 0.7   CALCIUM 8.0* 8.2*   PROT 6.5 6.0   BILITOT 1.1* 0.9   ALKPHOS 58 51*   ALT 10 10   AST 14 13     Recent Labs  Lab 03/22/17 1938   TROPONINI <0.006   ABG  No results found for: HGBA1C    Recent Labs  Lab 03/23/17  0509   PH 7.401   PO2 172*   PCO2 37.1   HCO3 23.1*   BE -2         Diagnostic Results  Reviewed     Assessment     Patient is a 48 yo woman with PMH of Crohn's, depression with h/o SI, Bipolar/Schizo, drug abuse who was transferred from [facility] ED for AMS, suspected for psychiatric OD per family report. Work up pending. Pulm was consulted for mechanical ventilation management.    Recommendations     Altered Mental Status/Acute encephalopathy  - suspected OD of Zyprexa/oxacarbazapine  oxacarbazapine level pending  - CT head x2 done at [facility] facility without acute abnl per report  - BCx, UCx, Resp Cx pending  - CXR unremarkable, Xray pending  EEG, MRI pending  - ABG 7.391/41/548 on 100% FiO2 450/16/5 -> 7.401/37/172 on 40% FiO2 400/16/5  - recs for repeat tox screen, lipase as patient vomitted x2 in ICU, consider CT abd  - pending work up results, will consider LP after initial work up, no signs of infection currently  - neuro/psych consulted    - will wean O2  currently stable on pressure support 30%, 10/5     Pt seen and examined and plan discussed with staff and fellow.         Thank you for this consult. Pulmonary will continue to follow.     Heladio Campuzano, PGY-I  [facility] - Pulmonary Service Team

## 2017-03-24 LAB
ALBUMIN SERPL BCP-MCNC: 2.7 G/DL
ALLENS TEST: ABNORMAL
ALP SERPL-CCNC: 49 U/L
ALT SERPL W/O P-5'-P-CCNC: 8 U/L
AMPHET+METHAMPHET UR QL: NEGATIVE
ANION GAP SERPL CALC-SCNC: 7 MMOL/L
AST SERPL-CCNC: 12 U/L
BACTERIA UR CULT: NORMAL
BARBITURATES UR QL SCN>200 NG/ML: NEGATIVE
BASOPHILS # BLD AUTO: 0.01 K/UL
BASOPHILS NFR BLD: 0.1 %
BENZODIAZ UR QL SCN>200 NG/ML: NEGATIVE
BILIRUB SERPL-MCNC: 0.6 MG/DL
BUN SERPL-MCNC: 4 MG/DL
BZE UR QL SCN: NEGATIVE
CALCIUM SERPL-MCNC: 8.2 MG/DL
CANNABINOIDS UR QL SCN: NEGATIVE
CHLORIDE SERPL-SCNC: 104 MMOL/L
CO2 SERPL-SCNC: 24 MMOL/L
CREAT SERPL-MCNC: 0.7 MG/DL
CREAT UR-MCNC: 132 MG/DL
DELSYS: ABNORMAL
DIFFERENTIAL METHOD: ABNORMAL
EOSINOPHIL # BLD AUTO: 0 K/UL
EOSINOPHIL NFR BLD: 0.1 %
ERYTHROCYTE [DISTWIDTH] IN BLOOD BY AUTOMATED COUNT: 12.5 %
ERYTHROCYTE [SEDIMENTATION RATE] IN BLOOD BY WESTERGREN METHOD: 16 MM/H
EST. GFR  (AFRICAN AMERICAN): >60 ML/MIN/1.73 M^2
EST. GFR  (NON AFRICAN AMERICAN): >60 ML/MIN/1.73 M^2
ETHANOL UR-MCNC: <10 MG/DL
FIO2: 21
FLOW: 70
GLUCOSE SERPL-MCNC: 130 MG/DL
HCO3 UR-SCNC: 25.8 MMOL/L (ref 24–28)
HCT VFR BLD AUTO: 30.1 %
HCT VFR BLD CALC: 28 %PCV (ref 36–54)
HGB BLD-MCNC: 10 G/DL
HGB BLD-MCNC: 10.3 G/DL
LIPASE SERPL-CCNC: 36 U/L
LYMPHOCYTES # BLD AUTO: 0.7 K/UL
LYMPHOCYTES NFR BLD: 7.5 %
MCH RBC QN AUTO: 30.2 PG
MCHC RBC AUTO-ENTMCNC: 34.2 %
MCV RBC AUTO: 88 FL
METHADONE UR QL SCN>300 NG/ML: NEGATIVE
MODE: ABNORMAL
MONOCYTES # BLD AUTO: 0.5 K/UL
MONOCYTES NFR BLD: 5.3 %
NEUTROPHILS # BLD AUTO: 7.5 K/UL
NEUTROPHILS NFR BLD: 86.8 %
OPIATES UR QL SCN: NEGATIVE
PCO2 BLDA: 41.2 MMHG (ref 35–45)
PCP UR QL SCN>25 NG/ML: NEGATIVE
PH SMN: 7.41 [PH] (ref 7.35–7.45)
PLATELET # BLD AUTO: 218 K/UL
PMV BLD AUTO: 10.1 FL
PO2 BLDA: 94 MMHG (ref 80–100)
POC BE: 1 MMOL/L
POC IONIZED CALCIUM: 1.2 MMOL/L (ref 1.06–1.42)
POC SATURATED O2: 97 % (ref 95–100)
POC TCO2: 27 MMOL/L (ref 23–27)
POTASSIUM BLD-SCNC: 3 MMOL/L (ref 3.5–5.1)
POTASSIUM SERPL-SCNC: 3 MMOL/L
PROT SERPL-MCNC: 5.8 G/DL
RBC # BLD AUTO: 3.41 M/UL
SAMPLE: ABNORMAL
SITE: ABNORMAL
SODIUM BLD-SCNC: 139 MMOL/L (ref 136–145)
SODIUM SERPL-SCNC: 135 MMOL/L
SP02: 100
TOXICOLOGY INFORMATION: NORMAL
WBC # BLD AUTO: 8.65 K/UL

## 2017-03-24 PROCEDURE — 36415 COLL VENOUS BLD VENIPUNCTURE: CPT

## 2017-03-24 PROCEDURE — 11000001 HC ACUTE MED/SURG PRIVATE ROOM

## 2017-03-24 PROCEDURE — 94761 N-INVAS EAR/PLS OXIMETRY MLT: CPT

## 2017-03-24 PROCEDURE — 94003 VENT MGMT INPAT SUBQ DAY: CPT

## 2017-03-24 PROCEDURE — 85025 COMPLETE CBC W/AUTO DIFF WBC: CPT

## 2017-03-24 PROCEDURE — 25000003 PHARM REV CODE 250: Performed by: INTERNAL MEDICINE

## 2017-03-24 PROCEDURE — 25000003 PHARM REV CODE 250: Performed by: HOSPITALIST

## 2017-03-24 PROCEDURE — 99900035 HC TECH TIME PER 15 MIN (STAT)

## 2017-03-24 PROCEDURE — 82803 BLOOD GASES ANY COMBINATION: CPT

## 2017-03-24 PROCEDURE — 25000003 PHARM REV CODE 250: Performed by: STUDENT IN AN ORGANIZED HEALTH CARE EDUCATION/TRAINING PROGRAM

## 2017-03-24 PROCEDURE — 80307 DRUG TEST PRSMV CHEM ANLYZR: CPT

## 2017-03-24 PROCEDURE — 63600175 PHARM REV CODE 636 W HCPCS: Performed by: HOSPITALIST

## 2017-03-24 PROCEDURE — 80053 COMPREHEN METABOLIC PANEL: CPT

## 2017-03-24 PROCEDURE — 36600 WITHDRAWAL OF ARTERIAL BLOOD: CPT

## 2017-03-24 PROCEDURE — 25000003 PHARM REV CODE 250

## 2017-03-24 PROCEDURE — 27000221 HC OXYGEN, UP TO 24 HOURS

## 2017-03-24 PROCEDURE — 83690 ASSAY OF LIPASE: CPT

## 2017-03-24 RX ORDER — POTASSIUM CHLORIDE 7.45 MG/ML
10 INJECTION INTRAVENOUS
Status: COMPLETED | OUTPATIENT
Start: 2017-03-24 | End: 2017-03-24

## 2017-03-24 RX ORDER — LORAZEPAM 1 MG/1
1 TABLET ORAL ONCE
Status: COMPLETED | OUTPATIENT
Start: 2017-03-25 | End: 2017-03-24

## 2017-03-24 RX ORDER — PROPOFOL 10 MG/ML
INJECTION, EMULSION INTRAVENOUS
Status: DISCONTINUED
Start: 2017-03-24 | End: 2017-03-24 | Stop reason: WASHOUT

## 2017-03-24 RX ORDER — DEXMEDETOMIDINE HYDROCHLORIDE 4 UG/ML
0.2 INJECTION, SOLUTION INTRAVENOUS CONTINUOUS
Status: DISCONTINUED | OUTPATIENT
Start: 2017-03-24 | End: 2017-03-24

## 2017-03-24 RX ORDER — DEXMEDETOMIDINE HYDROCHLORIDE 4 UG/ML
INJECTION, SOLUTION INTRAVENOUS
Status: COMPLETED
Start: 2017-03-24 | End: 2017-03-24

## 2017-03-24 RX ORDER — HEPARIN SODIUM 5000 [USP'U]/ML
5000 INJECTION, SOLUTION INTRAVENOUS; SUBCUTANEOUS EVERY 8 HOURS
Status: DISCONTINUED | OUTPATIENT
Start: 2017-03-24 | End: 2017-03-26

## 2017-03-24 RX ORDER — DEXMEDETOMIDINE HYDROCHLORIDE 4 UG/ML
0.2 INJECTION, SOLUTION INTRAVENOUS CONTINUOUS
Status: DISCONTINUED | OUTPATIENT
Start: 2017-03-24 | End: 2017-03-25

## 2017-03-24 RX ADMIN — DEXMEDETOMIDINE HYDROCHLORIDE 0.2 MCG/KG/HR: 4 INJECTION, SOLUTION INTRAVENOUS at 02:03

## 2017-03-24 RX ADMIN — SODIUM CHLORIDE: 0.45 INJECTION, SOLUTION INTRAVENOUS at 11:03

## 2017-03-24 RX ADMIN — HEPARIN SODIUM 5000 UNITS: 5000 INJECTION, SOLUTION INTRAVENOUS; SUBCUTANEOUS at 09:03

## 2017-03-24 RX ADMIN — DEXMEDETOMIDINE HYDROCHLORIDE 0.4 MCG/KG/HR: 4 INJECTION, SOLUTION INTRAVENOUS at 06:03

## 2017-03-24 RX ADMIN — HEPARIN SODIUM 5000 UNITS: 5000 INJECTION, SOLUTION INTRAVENOUS; SUBCUTANEOUS at 10:03

## 2017-03-24 RX ADMIN — CHLORHEXIDINE GLUCONATE 15 ML: 1.2 RINSE ORAL at 09:03

## 2017-03-24 RX ADMIN — FAMOTIDINE 20 MG: 10 INJECTION, SOLUTION INTRAVENOUS at 09:03

## 2017-03-24 RX ADMIN — POTASSIUM CHLORIDE 10 MEQ: 10 INJECTION, SOLUTION INTRAVENOUS at 10:03

## 2017-03-24 RX ADMIN — HEPARIN SODIUM 5000 UNITS: 5000 INJECTION, SOLUTION INTRAVENOUS; SUBCUTANEOUS at 12:03

## 2017-03-24 RX ADMIN — POTASSIUM CHLORIDE 10 MEQ: 10 INJECTION, SOLUTION INTRAVENOUS at 09:03

## 2017-03-24 RX ADMIN — LORAZEPAM 1 MG: 1 TABLET ORAL at 11:03

## 2017-03-24 RX ADMIN — POTASSIUM CHLORIDE 10 MEQ: 10 INJECTION, SOLUTION INTRAVENOUS at 12:03

## 2017-03-24 NOTE — PLAN OF CARE
Problem: Patient Care Overview  Goal: Plan of Care Review  Outcome: Ongoing (interventions implemented as appropriate)  ABCDEF Bundle: Pt is sedated, when awake follows commands. Pt is very agitated when awake. CAM score positive. Pt turning in the bed when prompted. No family at bedside.

## 2017-03-24 NOTE — PROGRESS NOTES
Progress Note  Pulmonary & Critical Care Medicine      SUBJECTIVE:     Reason for consult: b    LOS: 2 days    Interval history  Nurse and on-call team reports patient became combative, pulling tubes and agitated and was placed on Precedex. This AM, patient still very sedated, RASS -5, non-responsive, on S breathing mode since yesterday.     Scheduled Medications   chlorhexidine  15 mL Mouth/Throat BID    famotidine (PF)  20 mg Intravenous BID    heparin (porcine)  5,000 Units Subcutaneous Q8H    potassium chloride  10 mEq Intravenous Q2H       Medications PRN  dextrose 50%, dextrose 50%, glucagon (human recombinant), glucose, glucose, haloperidol lactate      OBJECTIVE:     Temp:  [97.6 °F (36.4 °C)-99.8 °F (37.7 °C)]   Pulse:  []   Resp:  [13-64]   BP: ()/()   SpO2:  [96 %-100 %]     Vitals:    03/24/17 0745   BP: (!) 90/53   Pulse: (!) 57   Resp: 19   Temp:        I & O (Last 24H):  I/O last 3 completed shifts:  In: 1386.3 [I.V.:1386.3]  Out: 1445 [Urine:1445]       Physical Exam:  GEN: intubated, non-responsive to stimuli/sedated, RASS -5  HENT: NCAT, PERRLA, ET tube in place, no LAD  Lungs: clear to auscultation b/l on CPAP mode  CV: RRR, No murmurs/gallops appreciated  Abd: soft, non-distended, active BS  Extremities: no cyanosis/edema   Skin: no active lesions/rashes/urticaria   Neuro:  Non-responsive to stimuli, no clonus, PERRLA, unable to complete full neuro exam     Laboratory:  No results for input(s): INR in the last 168 hours.    Recent Labs  Lab 03/22/17 1938 03/23/17  0325 03/24/17  0310 03/24/17  0443   WBC 10.57 11.46 8.65  --    HGB 11.8* 10.9* 10.3*  --    HCT 34.5* 32.0* 30.1* 28*    221 218  --      Lab Results   Component Value Date    TSH 0.834 03/22/2017     Recent Labs  Lab 03/22/17 1938 03/23/17  0325 03/24/17 0310   * 136 135*   K 3.7 3.8 3.0*    106 104   CO2 19* 22* 24   BUN 6 5* 4*   CREATININE 0.9 0.7 0.7   CALCIUM 8.0* 8.2* 8.2*   PROT 6.5  6.0 5.8*   BILITOT 1.1* 0.9 0.6   ALKPHOS 58 51* 49*   ALT 10 10 8*   AST 14 13 12     No results for input(s): MG in the last 168 hours.  Lab Results   Component Value Date    CALCIUM 8.2 (L) 03/24/2017     No results found for: HGBA1C      Recent Labs  Lab 03/22/17  1938 03/23/17  0325   CPK  --  111   TROPONINI <0.006  --        ABG    Recent Labs  Lab 03/24/17  0443   PH 7.406   PO2 94   PCO2 41.2   HCO3 25.8   BE 1       Diagnostic Results:  Reviewed      Assessment     Patient is a 46 yo woman with PMH of Crohn's, depression with h/o SI, Bipolar/Schizo, drug abuse who was transferred from [facility] ED for AMS, suspected for psychiatric OD per family report. Work up pending. Pulm was consulted for mechanical ventilation management.       Recommendations     Altered Mental Status/Acute encephalopathy  - suspected OD of Zyprexa/oxacarbazapine  oxacarbazapine level pending  - CT head x2 done at [facility] facility without acute abnl per report  - BCx, UCx pending  Resp Cx stain few GPC  - CXR unremarkable, Xray pending  EEG with diffused disorganized slowing of background, no signs of seizure  MRI without acute abnl  XR ab unremarkable, XR skull no acute abnl  - ABG this AM 7.406/41/94 on 21%  - lipase negative  tox screen pending   - work up unrevealing this AM, will consider LP today, no signs of infection currently  - neuro recs: neg MRI, EEG with diffuse slowing, psych consult pending   - Ox sat stable on spontaneous breathing, plan to wean off precedex today and possible LP       Will discuss with staff and fellow.     Heladio Campuzano, PGY-I  [facility] - Pulmonology Service Team

## 2017-03-24 NOTE — PROGRESS NOTES
[facility] Hospital Medicine Progress Note    Subjective:      Remains intubated. Became agitated overnight, but was able to follow commands, attempted to remove restraints, requiring Precedex gtt.      Objective:   Last 24 Hour Vital Signs:  BP  Min: 87/53  Max: 178/79  Temp  Av.8 °F (37.1 °C)  Min: 97.6 °F (36.4 °C)  Max: 99.8 °F (37.7 °C)  Pulse  Av.3  Min: 57  Max: 150  Resp  Av.4  Min: 13  Max: 64  SpO2  Av.5 %  Min: 96 %  Max: 100 %  Body mass index is 29.97 kg/(m^2).  I/O last 3 completed shifts:  In: 1386.3 [I.V.:1386.3]  Out: 1445 [Urine:1445]    Physical Examination:  GEN: intubated, no response but on Precedex  HEENT: NCAT, PERRL, ETT in place  CV: RRR, no m/r  RESP: CTAB anterior fields, vent sounds, intubated  ABD: soft, NTND, normoactive BS, no organomegaly  EXTR: no c/c/e, 2+ distal pulses x 4  NEURO: PERRL, no response but on Precedex  SKIN: no rashes, lesions, or color changes       Laboratory Results   WBC 8  Cr 0.7  ABG reviewed  RPR negative  HIV negative  Oxcarbazepine level pending  K 3  Lipase negative      Other Results:  EKG (my interpretation): NSR, no acute changes    Radiology:  OSH CT head - no bleed, per report  CXR - negative     Assessment:     Silvana Lopes is a 47 y.o. female with:  Patient Active Problem List    Diagnosis Date Noted    Altered mental status     Acute encephalopathy 2017        Plan:     Acute encephalopathy  -GCS 7, etiology unclear at this time. Concern for psychiatric medication overdose (zyprexa, trileptal). No obvious evidence of metabolic or infectious causes at this time. OSH CT head x 2 without bleed. Tox screen, Tylenol, and salicylate level negative at OSH. Ammonia level normal. EEG w/o seizure activity  -poison control center contacted by OSH, recommended supportive care  -infectious workup unremarkable thus far, brain MRI negative, check oxcarbazepine level, check volatiles  -neurology consult: just recommends psych consult,  awaiting recs  -intubated for airway protection. was never in respiratory distress. pulm consult for vent management  -will turn off Precedex and see if improvement in mental status  -consider LP if no improvement    Crohn's  -holding home medications for now    Bipolar disorder/schizophrenia  -holding home zyprexa, last invega injection 3/10/17  -concern for intentional overdose, has had suicide attempts in the past  -psych consult    Diet-NPO  PPX-heparin  Dispo-intubated, continue ICU care    Family Contact:  Latha Gonzalez (sister) - [phone]  Home phone - [phone]  Fiorella (sister) - [phone]    Code Status:     Full     Jeanette Best  [facility] Internal Medicine HO-III  [facility] Hospital Medicine Service B   [facility] Medicine Hospitalist Pager numbers:   [facility] Hospitalist Medicine Team A (Cyndy/Cristobal): [phone]  [facility] Hospitalist Medicine Team B (Farzana/Zacarias):  [phone]

## 2017-03-24 NOTE — PLAN OF CARE
Problem: Patient Care Overview  Goal: Plan of Care Review  Outcome: Ongoing (interventions implemented as appropriate)  Patient understands, and agrees with plan of care. ABCDEF Bundle. Not intubated nor sedated. Breathing on 2 liters nasal canula, respirations stable currently 20, lungs clear pulse ox 99. Cam score negative, patient alert and oriented times 4 and can follow commands. Patient can move independently in bed, and move all extremities. No signs of skin breakdown. Patient remain free from fall in injury throughout the shift. Family at bedside and involved with patient care. Patient denies pain throughout the shift. Patient remained NPO and voided Approprietly each hour. No BM. Side rails up times 2, call light in reach, will continue to monitor.

## 2017-03-24 NOTE — PROGRESS NOTES
Results for SALVATORE HOWELL (MRN [id number]) as of 3/24/2017 05:00   Ref. Range 3/24/2017 04:43   POC Sodium Latest Ref Range: 136 - 145 mmol/L 139   POC Potassium Latest Ref Range: 3.5 - 5.1 mmol/L 3.0 (L)   POC Ionized Calcium Latest Ref Range: 1.06 - 1.42 mmol/L 1.20   POC Hematocrit Latest Ref Range: 36 - 54 %PCV 28 (L)   POC HEMOGLOBIN Latest Units: g/dL 10   POC PH Latest Ref Range: 7.35 - 7.45  7.406   POC PCO2 Latest Ref Range: 35 - 45 mmHg 41.2   POC PO2 Latest Ref Range: 80 - 100 mmHg 94   POC BE Latest Ref Range: -2 to 2 mmol/L 1   POC HCO3 Latest Ref Range: 24 - 28 mmol/L 25.8   POC SATURATED O2 Latest Ref Range: 95 - 100 % 97   POC TCO2 Latest Ref Range: 23 - 27 mmol/L 27   FiO2 Unknown 21   Flow Unknown 70   Sample Unknown ARTERIAL   DelSys Unknown Adult Vent   Allens Test Unknown Pass   Site Unknown LR   Mode Unknown SPONT       Results communicated to nurse.

## 2017-03-24 NOTE — PROGRESS NOTES
"Pt extubated without complication at 1520. She had no immediate changes in her normal vital signs, no desaturations, and no signs of aspiration or difficulty breathing. She was able to follow commands and softly say "I'm okay" and that her throat is sore.   Recommend titrating O2 for goal SpO2 > 90% and speech eval for swallow function.   Also recommend evaluation for PEC given that this may have been a suicide attempt.   Hopefully now that she is able to speak, she can provide more insight into the cause of her illness.   Thank you for this consult.   Garry Vargas, PGY-4  Pulmonary & Critical Care Medicine  pager [phone]    "

## 2017-03-24 NOTE — PLAN OF CARE
Problem: Patient Care Overview  Goal: Plan of Care Review  Outcome: Ongoing (interventions implemented as appropriate)  Pt. Received on vent with settings as charted alarms on and working     ambu bag and mask at bedside   Will cont to maintin airway and vent

## 2017-03-24 NOTE — PROGRESS NOTES
Notified Dr. Shetty of elevated heart rate. MD stated he would call back after speaking to his upper level.

## 2017-03-24 NOTE — PLAN OF CARE
Problem: Patient Care Overview  Goal: Plan of Care Review  Ventilator settings were as noted in flowsheets.  Will continue to monitor.

## 2017-03-24 NOTE — NURSING
[facility]acarias team notified about patient multiple attempts to get out of bed and seeing a man walk around the room, no new orders, will continue to monitor.

## 2017-03-25 PROBLEM — R41.82 ALTERED MENTAL STATUS: Status: ACTIVE | Noted: 2017-03-25

## 2017-03-25 LAB
ALBUMIN SERPL BCP-MCNC: 2.8 G/DL
ALP SERPL-CCNC: 57 U/L
ALT SERPL W/O P-5'-P-CCNC: 11 U/L
ANION GAP SERPL CALC-SCNC: 10 MMOL/L
ANION GAP SERPL CALC-SCNC: 9 MMOL/L
AST SERPL-CCNC: 15 U/L
BACTERIA SPEC AEROBE CULT: NORMAL
BASOPHILS # BLD AUTO: 0.02 K/UL
BASOPHILS NFR BLD: 0.2 %
BILIRUB SERPL-MCNC: 0.6 MG/DL
BUN SERPL-MCNC: 7 MG/DL
BUN SERPL-MCNC: 7 MG/DL
CALCIUM SERPL-MCNC: 8.3 MG/DL
CALCIUM SERPL-MCNC: 8.6 MG/DL
CHLORIDE SERPL-SCNC: 107 MMOL/L
CHLORIDE SERPL-SCNC: 107 MMOL/L
CO2 SERPL-SCNC: 23 MMOL/L
CO2 SERPL-SCNC: 24 MMOL/L
CREAT SERPL-MCNC: 0.8 MG/DL
CREAT SERPL-MCNC: 0.8 MG/DL
DIFFERENTIAL METHOD: ABNORMAL
EOSINOPHIL # BLD AUTO: 0 K/UL
EOSINOPHIL NFR BLD: 0.3 %
ERYTHROCYTE [DISTWIDTH] IN BLOOD BY AUTOMATED COUNT: 12.5 %
EST. GFR  (AFRICAN AMERICAN): >60 ML/MIN/1.73 M^2
EST. GFR  (AFRICAN AMERICAN): >60 ML/MIN/1.73 M^2
EST. GFR  (NON AFRICAN AMERICAN): >60 ML/MIN/1.73 M^2
EST. GFR  (NON AFRICAN AMERICAN): >60 ML/MIN/1.73 M^2
GLUCOSE SERPL-MCNC: 76 MG/DL
GLUCOSE SERPL-MCNC: 95 MG/DL
GRAM STN SPEC: NORMAL
HCT VFR BLD AUTO: 33.4 %
HGB BLD-MCNC: 11.2 G/DL
LYMPHOCYTES # BLD AUTO: 0.8 K/UL
LYMPHOCYTES NFR BLD: 8.2 %
MAGNESIUM SERPL-MCNC: 1.7 MG/DL
MCH RBC QN AUTO: 29.7 PG
MCHC RBC AUTO-ENTMCNC: 33.5 %
MCV RBC AUTO: 89 FL
MONOCYTES # BLD AUTO: 1.2 K/UL
MONOCYTES NFR BLD: 12.5 %
NEUTROPHILS # BLD AUTO: 7.4 K/UL
NEUTROPHILS NFR BLD: 78.7 %
OXCARBAZEPINE METABOLITE: 60 MCG/ML
PLATELET # BLD AUTO: 238 K/UL
PMV BLD AUTO: 9.9 FL
POTASSIUM SERPL-SCNC: 3.3 MMOL/L
POTASSIUM SERPL-SCNC: 3.4 MMOL/L
PROT SERPL-MCNC: 6.5 G/DL
RBC # BLD AUTO: 3.77 M/UL
SODIUM SERPL-SCNC: 140 MMOL/L
SODIUM SERPL-SCNC: 140 MMOL/L
WBC # BLD AUTO: 9.44 K/UL

## 2017-03-25 PROCEDURE — 80048 BASIC METABOLIC PNL TOTAL CA: CPT

## 2017-03-25 PROCEDURE — 63600175 PHARM REV CODE 636 W HCPCS: Performed by: PSYCHIATRY & NEUROLOGY

## 2017-03-25 PROCEDURE — 11000001 HC ACUTE MED/SURG PRIVATE ROOM

## 2017-03-25 PROCEDURE — 80053 COMPREHEN METABOLIC PANEL: CPT

## 2017-03-25 PROCEDURE — 25000003 PHARM REV CODE 250: Performed by: STUDENT IN AN ORGANIZED HEALTH CARE EDUCATION/TRAINING PROGRAM

## 2017-03-25 PROCEDURE — 94761 N-INVAS EAR/PLS OXIMETRY MLT: CPT

## 2017-03-25 PROCEDURE — 25000003 PHARM REV CODE 250: Performed by: INTERNAL MEDICINE

## 2017-03-25 PROCEDURE — 85025 COMPLETE CBC W/AUTO DIFF WBC: CPT

## 2017-03-25 PROCEDURE — 25000003 PHARM REV CODE 250: Performed by: HOSPITALIST

## 2017-03-25 PROCEDURE — 36415 COLL VENOUS BLD VENIPUNCTURE: CPT

## 2017-03-25 PROCEDURE — 83735 ASSAY OF MAGNESIUM: CPT

## 2017-03-25 PROCEDURE — 63600175 PHARM REV CODE 636 W HCPCS: Performed by: STUDENT IN AN ORGANIZED HEALTH CARE EDUCATION/TRAINING PROGRAM

## 2017-03-25 RX ORDER — POTASSIUM CHLORIDE 20 MEQ/1
20 TABLET, EXTENDED RELEASE ORAL ONCE
Status: COMPLETED | OUTPATIENT
Start: 2017-03-25 | End: 2017-03-25

## 2017-03-25 RX ADMIN — HEPARIN SODIUM 5000 UNITS: 5000 INJECTION, SOLUTION INTRAVENOUS; SUBCUTANEOUS at 02:03

## 2017-03-25 RX ADMIN — HEPARIN SODIUM 5000 UNITS: 5000 INJECTION, SOLUTION INTRAVENOUS; SUBCUTANEOUS at 05:03

## 2017-03-25 RX ADMIN — POTASSIUM CHLORIDE: 149 INJECTION, SOLUTION, CONCENTRATE INTRAVENOUS at 11:03

## 2017-03-25 RX ADMIN — MAGNESIUM SULFATE HEPTAHYDRATE 1 G: 500 INJECTION, SOLUTION INTRAMUSCULAR; INTRAVENOUS at 11:03

## 2017-03-25 RX ADMIN — CHLORHEXIDINE GLUCONATE 15 ML: 1.2 RINSE ORAL at 09:03

## 2017-03-25 RX ADMIN — POTASSIUM CHLORIDE 20 MEQ: 20 TABLET, EXTENDED RELEASE ORAL at 07:03

## 2017-03-25 RX ADMIN — FAMOTIDINE 20 MG: 10 INJECTION, SOLUTION INTRAVENOUS at 09:03

## 2017-03-25 RX ADMIN — HEPARIN SODIUM 5000 UNITS: 5000 INJECTION, SOLUTION INTRAVENOUS; SUBCUTANEOUS at 09:03

## 2017-03-25 RX ADMIN — SODIUM CHLORIDE: 0.45 INJECTION, SOLUTION INTRAVENOUS at 09:03

## 2017-03-25 RX ADMIN — HALOPERIDOL LACTATE 2.5 MG: 5 INJECTION, SOLUTION INTRAMUSCULAR at 01:03

## 2017-03-25 NOTE — PLAN OF CARE
Pt attempts to get out of bed multiple times. Pt talks to herself and delusion. Valproic acid level <12.5, drug screen urine and compliance drug screen was negative. HR sustain in the 110-120s at rest and HR increase to 130s-150s with movement and activities. Notified Dr. Arias with Dr. Adames's team. Also notified charge nurse Ada and Dr. Arias about pulmonary recommend evaluation for PEC for patient.

## 2017-03-25 NOTE — PLAN OF CARE
Very concern about pt's safety. Multiple times get out of bed, pull lines out, and delusion. Pt is restless. Pt is high risk of fall. Fall precaution maintain. Notified Dr. Adames's team and charge nurse Nita

## 2017-03-25 NOTE — PROGRESS NOTES
[facility] [place] [facility] Medicine Progress Note    Subjective:      Weaned off precedex and extubated on 3/24. Able to tolerate PO after passing bedside swallow.  Multiple episodes where patient attempted to climb out of bed. Given Ativan x1 and Haldol x1 overnight. Currently on soft retraints. Admits to antipsychotic medication overdose.     Objective:   Last 24 Hour Vital Signs:  BP  Min: 95/58  Max: 170/74  Temp  Av.1 °F (37.3 °C)  Min: 98.8 °F (37.1 °C)  Max: 99.5 °F (37.5 °C)  Pulse  Av.8  Min: 58  Max: 155  Resp  Av  Min: 12  Max: 74  SpO2  Av.9 %  Min: 79 %  Max: 100 %  Weight  Av.6 kg (168 lb 14 oz)  Min: 76.6 kg (168 lb 14 oz)  Max: 76.6 kg (168 lb 14 oz)  Body mass index is 28.99 kg/(m^2).  I/O last 3 completed shifts:  In: 2841.2 [I.V.:2841.2]  Out: 3250 [Urine:3250]    Physical Examination:  GEN: intubated, no response but on Precedex  HEENT: NCAT, PERRL, ETT in place  CV: RRR, no m/r  RESP: CTAB anterior fields, vent sounds, intubated  ABD: soft, NTND, normoactive BS, no organomegaly  EXTR: no c/c/e, 2+ distal pulses x 4  NEURO: PERRL  SKIN: no rashes, lesions, or color changes       Laboratory Results   Trended Lab Data:    Recent Labs  Lab 17  0325 17  0310 17  0443 17  0301   WBC 11.46 8.65  --  9.44   HGB 10.9* 10.3*  --  11.2*   HCT 32.0* 30.1* 28* 33.4*    218  --  238   MCV 89 88  --  89   RDW 12.4 12.5  --  12.5    135*  --  140   K 3.8 3.0*  --  3.3*    104  --  107   CO2 22* 24  --  24   BUN 5* 4*  --  7   CREATININE 0.7 0.7  --  0.8   * 130*  --  95   PROT 6.0 5.8*  --  6.5   ALBUMIN 2.8* 2.7*  --  2.8*   BILITOT 0.9 0.6  --  0.6   AST 13 12  --  15   ALKPHOS 51* 49*  --  57   ALT 10 8*  --  11       Radiology:  Imaging Results         MRI Brain Without Contrast (Final result) Result time:  17 14:02:41    Final result by Donovan Puga MD (17 14:02:41)    Impression:         No evidence of acute infarction,  hemorrhage, mass, or hydrocephalus.      Electronically signed by: CRISTO DUTTON MD  Date:     03/23/17  Time:    14:02     Narrative:    Time of Procedure: 03/23/17 12:39:42  Accession # [id number]    Procedure: MRI the brain without contrast.    Technique: Multiplanar, multisequence MRI of the brain was obtained without the use of intravenous contrast.    Comparison: None    Findings: The brain parenchyma is normal in signal and contour. There are no abnormal areas of brain parenchymal signal. There are no areas of restricted diffusion to suggest acute infarction. The ventricles are normal in size and configuration without evidence for hydrocephalus. There are no abnormal areas of the gradient susceptibility to suggest parenchymal hemorrhage. No abnormal intra or extra axial fluid collections. The major intracranial T2  flow-voids are present.    The globes, orbits, pituitary gland, pineal gland, craniocervical junction, and upper cervical spine demonstrate no significant abnormalities.  The paranasal sinuses and mastoid air cells are clear.            X-Ray Abdomen AP 1 View (Final result) Result time:  03/23/17 13:31:07    Final result by Akira Garcia MD (03/23/17 13:31:07)    Impression:      As above.      Electronically signed by: AKIRA GARCIA MD  Date:     03/23/17  Time:    13:31     Narrative:    AP abdomen single view.  Comparison: None.    Suspected partially visualized enteric tube with distal tip seen at the level of the GE junction.  If this is in fact an enteric tube, recommend advancement.  Mild prominent gaseous distended small bowel loops.  No convincing evidence of obstruction.  No free air visualized.  Visualized lung bases are clear.  No unexpected metallic foreign body seen.            X-Ray Skull Ltd Less Than 4 Views (Final result) Result time:  03/23/17 13:29:07    Final result by Akira Garcia MD (03/23/17 13:29:07)    Impression:      As above.      Electronically signed  by: AKIRA GARCIA MD  Date:     03/23/17  Time:    13:29     Narrative:    Lateral skull single view.  Comparison: None.    Calvarium and cervical spine show no significant abnormalities.  No evidence of fracture.  No unexpected metallic foreign body seen.            X-Ray Chest 1 View (Final result) Result time:  03/22/17 20:14:05    Final result by Renaldo Miranda MD (03/22/17 20:14:05)    Impression:      No acute cardiopulmonary process.        Electronically signed by: RENALDO MIRANDA MD  Date:     03/22/17  Time:    20:14     Narrative:    Chest AP portable    Indication:Intubated    Comparison:None    Findings: Endotracheal tube tip lies approximately 4.3 cm above lars. The cardiomediastinal silhouette is not enlarged.  There is no pleural effusion.  The trachea is midline.  The lungs are symmetrically expanded bilaterally without evidence of acute parenchymal process. No large focal consolidation seen.  There is bandlike opacity projected over the left upper lung zone, likely external to patient.  There is no pneumothorax.  The osseous structures are remarkable for degenerative changes of the spine.             Assessment:     Silvana Lopes is a 47 y.o. female with:  Patient Active Problem List    Diagnosis Date Noted    Altered mental status     Acute encephalopathy 03/22/2017        Plan:     Acute encephalopathy 2/2 psych medication overdose  -GCS 7, etiology unclear at this time. Concern for psychiatric medication overdose (zyprexa, trileptal). No obvious evidence of metabolic or infectious causes at this time. OSH CT head x 2 without bleed. Tox screen, Tylenol, and salicylate level negative at OSH. Ammonia level normal. EEG w/o seizure activity  -poison control center contacted by OSH, recommended supportive care  -infectious workup unremarkable thus far, brain MRI negative, check oxcarbazepine level, check volatiles  -neurology consult: just recommends psych consult, awaiting recs  -intubated  for airway protection. was never in respiratory distress. pulm consult for vent management  -Placed on precedex following intubation  -Weaned off precedex and extubated on 3/25  -Overnight, mentation seems to be waxing and waning  - Is conversational and does follow directions but needs frequent re-direction  - Placed on soft restraints overnight  - Admits overnight to overdosing on medication (unclear what was taken)  - Continue to monitor today and assess mentation    Crohn's  -holding home medications for now    Bipolar disorder/schizophrenia  -holding home zyprexa, last invega injection 3/10/17  -concern for intentional overdose, has had suicide attempts in the past  -psych consult; awaiting official note  -will avoid anti-psychotics if possible, possible PEC, will discuss with staff    Diet-NPO  PPX-heparin  Dispo - improving mentation    Family Contact:  Latha Gonzalez (sister) - [phone]  Home phone - [phone]  Fiorella (sister) - [phone]    Code Status:     Full     Bubba Estevez  hospitals Internal Medicine HO-III  [facility] Medicine Service B   hospitals Medicine Hospitalist Pager numbers:   hospitals Hospitalist Medicine Team A (Cyndy/Cristobal): [phone]  hospitals Hospitalist Medicine Team B (Farzana/Zacarias):  [phone]

## 2017-03-25 NOTE — PLAN OF CARE
Pt is restless all night and try to get out of bed multiple times. HR and BP elevate. Multiple times nurse reoriented patients and notified Dr. Mac and Dr. Alejandro.     The first time: Dr. Mac came to the room and reoriented patient, MD witness of patient agitation and try to get out of bed. After MD left, patient still agitate, hallucination and try to get out of bed HR sustain in the 120s-150s and BP elevated. Nurse notified MD again, MD order 1 mg ativan PO. Nurse notified MD about ativan did not help patient. MD order soft restrains for patient. Pt still try to get out of bed even with restrains, delusions and HR and BP elevated.     Nurse concern about patient's care. Pulmonary on case, nurse call eICU for any recommend to add for patient's benefit. eICU advice to give haloperidol prn already ordered in MAR for agitation. Nurse gave haloperidol per ordered for agitation. Dr. Mac called nurse and told nurse that nurse need to call Dr. Mac before giving prn medication ordered in MAR and before calling eICU. Nurse suggest Dr. Mac that he should put order as nursing communication order, so all nurses who take care patients will follow the the same orders.     Nurse also transfer the call to charge nurse Genia.

## 2017-03-25 NOTE — PLAN OF CARE
Pt passed bedside swallow study. Pt ate a sandwich and drank two cranberry juice. Family at bedside.

## 2017-03-26 LAB
ALBUMIN SERPL BCP-MCNC: 2.5 G/DL
ALP SERPL-CCNC: 53 U/L
ALT SERPL W/O P-5'-P-CCNC: 11 U/L
ANION GAP SERPL CALC-SCNC: 7 MMOL/L
AST SERPL-CCNC: 18 U/L
BASOPHILS # BLD AUTO: 0.03 K/UL
BASOPHILS NFR BLD: 0.3 %
BILIRUB SERPL-MCNC: 0.5 MG/DL
BUN SERPL-MCNC: 9 MG/DL
CALCIUM SERPL-MCNC: 8.6 MG/DL
CHLORIDE SERPL-SCNC: 108 MMOL/L
CO2 SERPL-SCNC: 23 MMOL/L
CREAT SERPL-MCNC: 0.8 MG/DL
DIFFERENTIAL METHOD: ABNORMAL
EOSINOPHIL # BLD AUTO: 0.1 K/UL
EOSINOPHIL NFR BLD: 1 %
ERYTHROCYTE [DISTWIDTH] IN BLOOD BY AUTOMATED COUNT: 12.4 %
EST. GFR  (AFRICAN AMERICAN): >60 ML/MIN/1.73 M^2
EST. GFR  (NON AFRICAN AMERICAN): >60 ML/MIN/1.73 M^2
ETHYLENE GLYCOL SERPLBLD-MCNC: <5 MG/DL
GLUCOSE SERPL-MCNC: 90 MG/DL
HCT VFR BLD AUTO: 32.6 %
HGB BLD-MCNC: 11.2 G/DL
LYMPHOCYTES # BLD AUTO: 1.3 K/UL
LYMPHOCYTES NFR BLD: 15.2 %
MAGNESIUM SERPL-MCNC: 1.9 MG/DL
MCH RBC QN AUTO: 30.4 PG
MCHC RBC AUTO-ENTMCNC: 34.4 %
MCV RBC AUTO: 88 FL
MONOCYTES # BLD AUTO: 1.1 K/UL
MONOCYTES NFR BLD: 13 %
NEUTROPHILS # BLD AUTO: 6.1 K/UL
NEUTROPHILS NFR BLD: 70.2 %
PLATELET # BLD AUTO: 224 K/UL
PMV BLD AUTO: 9.6 FL
POTASSIUM SERPL-SCNC: 4 MMOL/L
PROT SERPL-MCNC: 6.3 G/DL
RBC # BLD AUTO: 3.69 M/UL
SODIUM SERPL-SCNC: 138 MMOL/L
WBC # BLD AUTO: 8.63 K/UL

## 2017-03-26 PROCEDURE — 25000003 PHARM REV CODE 250: Performed by: INTERNAL MEDICINE

## 2017-03-26 PROCEDURE — 85025 COMPLETE CBC W/AUTO DIFF WBC: CPT

## 2017-03-26 PROCEDURE — 80053 COMPREHEN METABOLIC PANEL: CPT

## 2017-03-26 PROCEDURE — 94761 N-INVAS EAR/PLS OXIMETRY MLT: CPT

## 2017-03-26 PROCEDURE — 11000001 HC ACUTE MED/SURG PRIVATE ROOM

## 2017-03-26 PROCEDURE — 25000003 PHARM REV CODE 250: Performed by: HOSPITALIST

## 2017-03-26 PROCEDURE — 63600175 PHARM REV CODE 636 W HCPCS: Performed by: HOSPITALIST

## 2017-03-26 PROCEDURE — 36415 COLL VENOUS BLD VENIPUNCTURE: CPT

## 2017-03-26 PROCEDURE — 83735 ASSAY OF MAGNESIUM: CPT

## 2017-03-26 RX ORDER — ENOXAPARIN SODIUM 100 MG/ML
40 INJECTION SUBCUTANEOUS EVERY 24 HOURS
Status: DISCONTINUED | OUTPATIENT
Start: 2017-03-26 | End: 2017-03-28 | Stop reason: HOSPADM

## 2017-03-26 RX ADMIN — FAMOTIDINE 20 MG: 10 INJECTION, SOLUTION INTRAVENOUS at 09:03

## 2017-03-26 RX ADMIN — HEPARIN SODIUM 5000 UNITS: 5000 INJECTION, SOLUTION INTRAVENOUS; SUBCUTANEOUS at 06:03

## 2017-03-26 RX ADMIN — FAMOTIDINE 20 MG: 10 INJECTION, SOLUTION INTRAVENOUS at 08:03

## 2017-03-26 RX ADMIN — CHLORHEXIDINE GLUCONATE 15 ML: 1.2 RINSE ORAL at 08:03

## 2017-03-26 RX ADMIN — CHLORHEXIDINE GLUCONATE 15 ML: 1.2 RINSE ORAL at 09:03

## 2017-03-26 RX ADMIN — ENOXAPARIN SODIUM 40 MG: 100 INJECTION SUBCUTANEOUS at 05:03

## 2017-03-26 NOTE — PROGRESS NOTES
"[facility] Medicine Progress Note    Subjective:      Awake, alert, and oriented this morning. Admits to taking 10 each of Trileptal & Zyprexa. Reports she was not suicidal or trying to hurt herself, but claims it was to get seen by a doctor as "my family has been trying to hurt me."     Objective:   Last 24 Hour Vital Signs:  BP  Min: 84/46  Max: 178/78  Temp  Av.1 °F (37.3 °C)  Min: 98.5 °F (36.9 °C)  Max: 99.9 °F (37.7 °C)  Pulse  Av.6  Min: 74  Max: 113  Resp  Av.9  Min: 16  Max: 31  SpO2  Av.3 %  Min: 90 %  Max: 100 %  Weight  Av.6 kg (184 lb 4.9 oz)  Min: 83.6 kg (184 lb 4.9 oz)  Max: 83.6 kg (184 lb 4.9 oz)  Body mass index is 31.64 kg/(m^2).  I/O last 3 completed shifts:  In: 3191.7 [P.O.:600; I.V.:2591.7]  Out: 4550 [Urine:4550]    Physical Examination:  GEN: aaox3, nad  HEENT: NCAT, PERRL, eomi, clear oropharynx  CV: RRR, no m/r  RESP: CTAB, no wheezes/crackles  ABD: soft, NTND, normoactive BS, no organomegaly  EXTR: no c/c/e, 2+ distal pulses x 4  NEURO: PERRL, no focal deficits, eomi, moving all extremities  SKIN: no rashes, lesions, or color changes       Laboratory Results   Trended Lab Data:    Recent Labs  Lab 17  0310 17  0443 17  0301 17  1653 17  0323 17  0324   WBC 8.65  --  9.44  --   --  8.63   HGB 10.3*  --  11.2*  --   --  11.2*   HCT 30.1* 28* 33.4*  --   --  32.6*     --  238  --   --  224   MCV 88  --  89  --   --  88   RDW 12.5  --  12.5  --   --  12.4   *  --  140 140 138  --    K 3.0*  --  3.3* 3.4* 4.0  --      --  107 107 108  --    CO2 24  --  24 23 23  --    BUN 4*  --  7 7 9  --    CREATININE 0.7  --  0.8 0.8 0.8  --    *  --  95 76 90  --    PROT 5.8*  --  6.5  --  6.3  --    ALBUMIN 2.7*  --  2.8*  --  2.5*  --    BILITOT 0.6  --  0.6  --  0.5  --    AST 12  --  15  --  18  --    ALKPHOS 49*  --  57  --  53*  --    ALT 8*  --  11  --  11  --        Radiology:  Imaging Results         MRI " Brain Without Contrast (Final result) Result time:  03/23/17 14:02:41    Final result by Donovan Puga MD (03/23/17 14:02:41)    Impression:         No evidence of acute infarction, hemorrhage, mass, or hydrocephalus.      Electronically signed by: DONOVAN PUGA MD  Date:     03/23/17  Time:    14:02     Narrative:    Time of Procedure: 03/23/17 12:39:42  Accession # [id number]    Procedure: MRI the brain without contrast.    Technique: Multiplanar, multisequence MRI of the brain was obtained without the use of intravenous contrast.    Comparison: None    Findings: The brain parenchyma is normal in signal and contour. There are no abnormal areas of brain parenchymal signal. There are no areas of restricted diffusion to suggest acute infarction. The ventricles are normal in size and configuration without evidence for hydrocephalus. There are no abnormal areas of the gradient susceptibility to suggest parenchymal hemorrhage. No abnormal intra or extra axial fluid collections. The major intracranial T2  flow-voids are present.    The globes, orbits, pituitary gland, pineal gland, craniocervical junction, and upper cervical spine demonstrate no significant abnormalities.  The paranasal sinuses and mastoid air cells are clear.            X-Ray Abdomen AP 1 View (Final result) Result time:  03/23/17 13:31:07    Final result by Akira Garcia MD (03/23/17 13:31:07)    Impression:      As above.      Electronically signed by: AKIRA GARCIA MD  Date:     03/23/17  Time:    13:31     Narrative:    AP abdomen single view.  Comparison: None.    Suspected partially visualized enteric tube with distal tip seen at the level of the GE junction.  If this is in fact an enteric tube, recommend advancement.  Mild prominent gaseous distended small bowel loops.  No convincing evidence of obstruction.  No free air visualized.  Visualized lung bases are clear.  No unexpected metallic foreign body seen.            X-Ray Skull Ltd  Less Than 4 Views (Final result) Result time:  03/23/17 13:29:07    Final result by Aditi Garcia MD (03/23/17 13:29:07)    Impression:      As above.      Electronically signed by: ADITI GARCIA MD  Date:     03/23/17  Time:    13:29     Narrative:    Lateral skull single view.  Comparison: None.    Calvarium and cervical spine show no significant abnormalities.  No evidence of fracture.  No unexpected metallic foreign body seen.            X-Ray Chest 1 View (Final result) Result time:  03/22/17 20:14:05    Final result by Renaldo Miranda MD (03/22/17 20:14:05)    Impression:      No acute cardiopulmonary process.        Electronically signed by: RENALDO MIRANDA MD  Date:     03/22/17  Time:    20:14     Narrative:    Chest AP portable    Indication:Intubated    Comparison:None    Findings: Endotracheal tube tip lies approximately 4.3 cm above lars. The cardiomediastinal silhouette is not enlarged.  There is no pleural effusion.  The trachea is midline.  The lungs are symmetrically expanded bilaterally without evidence of acute parenchymal process. No large focal consolidation seen.  There is bandlike opacity projected over the left upper lung zone, likely external to patient.  There is no pneumothorax.  The osseous structures are remarkable for degenerative changes of the spine.             Assessment:     Silvana Lopes is a 47 y.o. female with:  Patient Active Problem List    Diagnosis Date Noted    Altered mental status 03/25/2017    Acute encephalopathy 03/22/2017        Plan:     Acute encephalopathy 2/2 psych medication overdose  -GCS 7 initially. Admits to psychiatric medication overdose (zyprexa, trileptal). No obvious evidence of metabolic or infectious causes. OSH CT head x 2 without bleed. Tox screen, Tylenol, and salicylate level negative at OSH. Ammonia level normal. EEG w/o seizure activity  -intubated for airway protection initially. was never in respiratory distress. Weaned off precedex  and extubated  -poison control center contacted by OSH, recommended supportive care  -oxcarbazepine level elevated  -infectious workup unremarkable, brain MRI negative  -neurology consult: just recommends psych consult, awaiting psych recs    Crohn's  -holding home medications for now    Bipolar disorder/schizophrenia  -holding home zyprexa, last invega injection 3/10/17  -intentional overdose, has had suicide attempts in the past  -psych consult; awaiting official note  -will avoid anti-psychotics if possible, possible PEC    Diet-NPO  PPX-heparin  Dispo - awaiting psych recs, likely needs inpatient psych placement, patient is medical cleared for this    Family Contact:  Latha Gonzalez (sister) - [phone]  Home phone - [phone]  Fiorella (sister) - [phone]    Code Status:     Elizabeth Best  [facility] Internal Medicine -III  [facility] Medicine Service B   [facility] Medicine Hospitalist Pager numbers:   [facility] Hospitalist Medicine Team A (Cyndy/Cristobal): [phone]  [facility] Hospitalist Medicine Team B (Farzana/Zacarias):

## 2017-03-27 VITALS
HEART RATE: 64 BPM | OXYGEN SATURATION: 96 % | BODY MASS INDEX: 30.04 KG/M2 | WEIGHT: 175.94 LBS | TEMPERATURE: 99 F | RESPIRATION RATE: 1 BRPM | DIASTOLIC BLOOD PRESSURE: 58 MMHG | SYSTOLIC BLOOD PRESSURE: 122 MMHG | HEIGHT: 64 IN

## 2017-03-27 LAB
ALBUMIN SERPL BCP-MCNC: 2.6 G/DL
ALP SERPL-CCNC: 53 U/L
ALT SERPL W/O P-5'-P-CCNC: 15 U/L
ANION GAP SERPL CALC-SCNC: 7 MMOL/L
AST SERPL-CCNC: 23 U/L
BASOPHILS # BLD AUTO: 0.03 K/UL
BASOPHILS NFR BLD: 0.4 %
BILIRUB SERPL-MCNC: 0.3 MG/DL
BUN SERPL-MCNC: 8 MG/DL
CALCIUM SERPL-MCNC: 9 MG/DL
CHLORIDE SERPL-SCNC: 109 MMOL/L
CO2 SERPL-SCNC: 25 MMOL/L
CREAT SERPL-MCNC: 0.8 MG/DL
DIFFERENTIAL METHOD: ABNORMAL
EOSINOPHIL # BLD AUTO: 0.2 K/UL
EOSINOPHIL NFR BLD: 1.9 %
ERYTHROCYTE [DISTWIDTH] IN BLOOD BY AUTOMATED COUNT: 12.4 %
EST. GFR  (AFRICAN AMERICAN): >60 ML/MIN/1.73 M^2
EST. GFR  (NON AFRICAN AMERICAN): >60 ML/MIN/1.73 M^2
GLUCOSE SERPL-MCNC: 100 MG/DL
HCT VFR BLD AUTO: 32.8 %
HGB BLD-MCNC: 11.2 G/DL
LYMPHOCYTES # BLD AUTO: 1.4 K/UL
LYMPHOCYTES NFR BLD: 18.5 %
MAGNESIUM SERPL-MCNC: 1.7 MG/DL
MCH RBC QN AUTO: 30.1 PG
MCHC RBC AUTO-ENTMCNC: 34.1 %
MCV RBC AUTO: 88 FL
METHANOL SERPL-MCNC: <5 MG/DL
MONOCYTES # BLD AUTO: 0.8 K/UL
MONOCYTES NFR BLD: 10.6 %
NEUTROPHILS # BLD AUTO: 5.3 K/UL
NEUTROPHILS NFR BLD: 68.3 %
PLATELET # BLD AUTO: 275 K/UL
PMV BLD AUTO: 9.9 FL
POTASSIUM SERPL-SCNC: 3.9 MMOL/L
PROT SERPL-MCNC: 6.4 G/DL
RBC # BLD AUTO: 3.72 M/UL
SODIUM SERPL-SCNC: 141 MMOL/L
WBC # BLD AUTO: 7.71 K/UL

## 2017-03-27 PROCEDURE — 25000003 PHARM REV CODE 250: Performed by: PSYCHIATRY & NEUROLOGY

## 2017-03-27 PROCEDURE — 25000003 PHARM REV CODE 250: Performed by: HOSPITALIST

## 2017-03-27 PROCEDURE — 25000003 PHARM REV CODE 250: Performed by: INTERNAL MEDICINE

## 2017-03-27 PROCEDURE — 83735 ASSAY OF MAGNESIUM: CPT

## 2017-03-27 PROCEDURE — 36415 COLL VENOUS BLD VENIPUNCTURE: CPT

## 2017-03-27 PROCEDURE — 94761 N-INVAS EAR/PLS OXIMETRY MLT: CPT

## 2017-03-27 PROCEDURE — 80053 COMPREHEN METABOLIC PANEL: CPT

## 2017-03-27 PROCEDURE — 85025 COMPLETE CBC W/AUTO DIFF WBC: CPT

## 2017-03-27 RX ORDER — OXCARBAZEPINE 150 MG/1
300 TABLET, FILM COATED ORAL 2 TIMES DAILY
Status: DISCONTINUED | OUTPATIENT
Start: 2017-03-27 | End: 2017-03-28 | Stop reason: HOSPADM

## 2017-03-27 RX ORDER — OXCARBAZEPINE 150 MG/1
300 TABLET, FILM COATED ORAL 3 TIMES DAILY
Status: DISCONTINUED | OUTPATIENT
Start: 2017-03-27 | End: 2017-03-27

## 2017-03-27 RX ORDER — PALIPERIDONE 3 MG/1
3 TABLET, EXTENDED RELEASE ORAL DAILY
Status: DISCONTINUED | OUTPATIENT
Start: 2017-03-27 | End: 2017-03-28 | Stop reason: HOSPADM

## 2017-03-27 RX ORDER — PALIPERIDONE 3 MG/1
3 TABLET, EXTENDED RELEASE ORAL DAILY
Qty: 30 TABLET | Refills: 0 | Status: ON HOLD | OUTPATIENT
Start: 2017-03-27 | End: 2019-07-05 | Stop reason: HOSPADM

## 2017-03-27 RX ORDER — OXCARBAZEPINE 300 MG/1
300 TABLET, FILM COATED ORAL 2 TIMES DAILY
Qty: 30 TABLET | Refills: 0 | Status: ON HOLD | OUTPATIENT
Start: 2017-03-27 | End: 2019-07-05 | Stop reason: HOSPADM

## 2017-03-27 RX ADMIN — OXCARBAZEPINE 300 MG: 150 TABLET, FILM COATED ORAL at 10:03

## 2017-03-27 RX ADMIN — PALIPERIDONE 3 MG: 3 TABLET, EXTENDED RELEASE ORAL at 10:03

## 2017-03-27 RX ADMIN — FAMOTIDINE 20 MG: 10 INJECTION, SOLUTION INTRAVENOUS at 09:03

## 2017-03-27 RX ADMIN — CHLORHEXIDINE GLUCONATE 15 ML: 1.2 RINSE ORAL at 09:03

## 2017-03-27 NOTE — PLAN OF CARE
TN received call from Mario at  [facility] that pt accepted to facility on 3 rd floor and for nurse to call report to [phone] to nurse Haydee. Pt transferred from ICU to 526. TN notified pt's nurse STEVE Sebastian to call report. TN called [facility] to inform of a [facility] transport  and filled out trip sheet for [facility] transport. TN was informed transport will be here within the hour.Discharge packet left at nurse's station and Laterriel informed. STEVE Chauhan ICU nurse informed pt's family of plan to transfer to [facility]     03/27/17 0674   Final Note   Assessment Type Final Discharge Note   Discharge Disposition Psych  ([facility])   Discharge planning education complete? Yes   What phone number can be called within the next 1-3 days to see how you are doing after discharge? [phone]   [facility] n/a   Discharge/Hospital Encounter Summary to (non-[facility]) PCP n/a   Referral to Outpatient Case Management complete? n/a   Referral to / orders for Home Health Complete? n/a   30 day supply of medicines given at discharge, if documented non-compliance / non-adherence? n/a   Any social issues identified prior to discharge? Yes   Did you assess the readiness or willingness of the family or caregiver to support self management of care? Yes   Right Care Referral Info   Post Acute Recommendation Other   Referral Type psych   Facility Name [facility]

## 2017-03-27 NOTE — PROGRESS NOTES
Pt to be discharged to [facility]- TN spoke to Mario in intake at [facility] and faxed him all pt information and PEC to fax # [phone]. TN informed ICU nurse Tien and STEVE Doe Charge nurse of above and TN will inform STEVE Chauhan when to call report to [facility] ([phone]). Discharge packet with [facility] left at nurse's station with pt's blue chart and Tien informed.
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
## 2017-03-27 NOTE — NURSING
Pt received from icu alert and oriented no acute distress noted no complaints voiced denies pain at this time sitter at lunch nurse sitting with pt at this time

## 2017-03-28 LAB
BACTERIA BLD CULT: NORMAL
BACTERIA BLD CULT: NORMAL

## 2017-03-28 NOTE — DISCHARGE SUMMARY
"[facility] Medicine Discharge Summary    Primary Team: [facility] Medicine Team B  Attending Physician: Garry Adames  Resident: Dr Jeanette Best  Intern: Dr Paulino Baker    Date of Admit: 3/22/2017  Date of Discharge: 3/27/17  Discharge to: [facility]  Condition: guarded    Discharge Diagnoses     Acute encephalopathy 2/2 psychiatric medication overdose    Consultants and Procedures     Consultants:  Psychiatry    Procedures:   None     Brief History of Present Illness     Silvana Lopes is a 47 y.o. AA female with Crohn's, depression, and bipolar/schizophrenia who was transferred from [facility] ED. History obtained via OSH records. I have been unable to reach family. Pt presented 3/22/17 to above ED at 10:03AM after being found unconscious in bed at home. Family thinks patient may have taken an overdose of depression medication. They had last spoken to patient around 8:17am, but even then pt seemed "a lot different & weak."     Given Narcan 2 mg via EMS without response. Her ED triage vitals were temp 97.4, /85 (BP as low as 90/56, which improved), , RR 12, 99% on RA. She received 3 boluses of 1 liter NS in OSH ED, and started on D51/2NS at 125 ml/hr. Pupils reportedly pinpoint. GCS 7 (2 eye opening to pain, 1 non-verbal, 4 withdraws). Just prior to transport here, she began vomiting, so she was intubated for airway protection.     Apparently, last dose of Invega given 3/10/17, per [facility] (mental health).     Hospital Course By Problem with Pertinent Findings     Acute encephalopathy 2/2 psych medication overdose  Presented with GCS 7 initially. She was intubated at OSH ED for airway protection as she was vomiting. The poison control center was contacted, who recommended supportive care She required Precedex gtt briefly for agitation on the ventilator. She was extubated after mental status improved, and admitted to psychiatric medication overdose " (Zyprexa, Trileptal). No obvious evidence of metabolic or infectious causes. OSH CT head was negative x 2. Tox screen, Tylenol, and salicylate level were negative at OSH. Ammonia level was normal. EEG was w/o seizure activity. Oxcarbazepine level was elevated as suspected. Infectious workup was unremarkable. Brain MRI was negative. Neurology consult: just recommended Psych consult, who PEC'd patient and recommended transfer to inpatient psychiatric facility.      Crohn's  No acute issues. Continue home medication regimen, Imuran and Remicade      Bipolar disorder/schizophrenia  Last invega injection 3/10/17. Intentional overdose, has had suicide attempts in the past, although patient denies wishing to harm herself. Pt gravely disabled, PEC'd, inpatient psychiatric placment    Discharge Medications        Medication List      START taking these medications          oxcarbazepine 300 MG Tab   Commonly known as:  TRILEPTAL   Take 1 tablet (300 mg total) by mouth 2 (two) times daily.       paliperidone 3 MG Tr24   Commonly known as:  INVEGA   Take 1 tablet (3 mg total) by mouth once daily.            Where to Get Your Medications      You can get these medications from any pharmacy     Bring a paper prescription for each of these medications     oxcarbazepine 300 MG Tab    paliperidone 3 MG Tr24             Discharge Information:   Diet:  Regular     Physical Activity:  As tolerated    Instructions:  1. Take all medications as prescribed  2. Keep all follow-up appointments  3. Return to the hospital or call your primary care physicians if any worsening symptoms or any other acute issues occur.      Follow-Up Appointments:  PCP  Psychiatry    Jeanette Best  [facility] Internal Medicine, [facility]
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
## 2017-03-28 NOTE — PROGRESS NOTES
Report given from LA Abhay, RN patient is waiting on a ride to [facility] And her IV access was discontinued. Patient's paper work is in the folder at the nurses station and the sitter is at her bedside. Patient assessed requested to take a shower and wanted some lemonade, vanilla pudding and blueberries. Informed patient that she could take a shower at the facility she is going to because her ride is on its way and that we only have the pudding. Patient given pudding and not long after two men from the transfer company came to  patient. Discharge information paperwork given to the person in charge and patient ambulated down the lara with the two men here to take her to [facility].

## 2019-07-01 PROBLEM — R73.9 HYPERGLYCEMIA: Chronic | Status: ACTIVE | Noted: 2019-07-01

## 2019-07-01 PROBLEM — F25.9 SCHIZOAFFECTIVE DISORDER: Status: ACTIVE | Noted: 2019-07-01

## 2021-11-24 NOTE — PROGRESS NOTES
"[facility] Medicine Progress Note    Subjective:      No events overnight. Patient has been calm and cooperative. States she probably took 20 each of her trileptal & zyprexa, b/c "someone was trying to murder me", denies wanting to hurt or kill herself.      Objective:   Last 24 Hour Vital Signs:  BP  Min: 112/62  Max: 172/80  Temp  Av.1 °F (37.3 °C)  Min: 98.9 °F (37.2 °C)  Max: 99.4 °F (37.4 °C)  Pulse  Av.9  Min: 65  Max: 108  Resp  Av.3  Min: 0  Max: 37  SpO2  Av.5 %  Min: 94 %  Max: 100 %  Weight  Av.8 kg (175 lb 14.8 oz)  Min: 79.8 kg (175 lb 14.8 oz)  Max: 79.8 kg (175 lb 14.8 oz)  Body mass index is 30.2 kg/(m^2).  I/O last 3 completed shifts:  In: 1456.3 [P.O.:500; I.V.:956.3]  Out: 3370 [Urine:3370]    Physical Examination:  GEN: aaox3, nad  HEENT: NCAT, PERRL, eomi, clear oropharynx  CV: RRR, no m/r  RESP: CTAB, no wheezes/crackles  ABD: soft, NTND, normoactive BS, no organomegaly  EXTR: no c/c/e, 2+ distal pulses x 4  NEURO: PERRL, no focal deficits, eomi, moving all extremities  SKIN: no rashes, lesions, or color changes       Laboratory Results   Trended Lab Data:    Recent Labs  Lab 17  0301 17  1653 17  0323 17  0324 17  0330   WBC 9.44  --   --  8.63 7.71   HGB 11.2*  --   --  11.2* 11.2*   HCT 33.4*  --   --  32.6* 32.8*     --   --  224 275   MCV 89  --   --  88 88   RDW 12.5  --   --  12.4 12.4    140 138  --  141   K 3.3* 3.4* 4.0  --  3.9    107 108  --  109   CO2 24 23 23  --  25   BUN 7 7 9  --  8   CREATININE 0.8 0.8 0.8  --  0.8   GLU 95 76 90  --  100   PROT 6.5  --  6.3  --  6.4   ALBUMIN 2.8*  --  2.5*  --  2.6*   BILITOT 0.6  --  0.5  --  0.3   AST 15  --  18  --  23   ALKPHOS 57  --  53*  --  53*   ALT 11  --  11  --  15       Radiology:  Imaging Results         MRI Brain Without Contrast (Final result) Result time:  17 14:02:41    Final result by Donovan Puga MD (17 14:02:41)    Impression: " Pc to Adi Carrillo states she doubled checked rx bottle Dr Jojo Garza wrote Eliquis 2.5m qd         No evidence of acute infarction, hemorrhage, mass, or hydrocephalus.      Electronically signed by: CRISTO DUTTON MD  Date:     03/23/17  Time:    14:02     Narrative:    Time of Procedure: 03/23/17 12:39:42  Accession # [id number]    Procedure: MRI the brain without contrast.    Technique: Multiplanar, multisequence MRI of the brain was obtained without the use of intravenous contrast.    Comparison: None    Findings: The brain parenchyma is normal in signal and contour. There are no abnormal areas of brain parenchymal signal. There are no areas of restricted diffusion to suggest acute infarction. The ventricles are normal in size and configuration without evidence for hydrocephalus. There are no abnormal areas of the gradient susceptibility to suggest parenchymal hemorrhage. No abnormal intra or extra axial fluid collections. The major intracranial T2  flow-voids are present.    The globes, orbits, pituitary gland, pineal gland, craniocervical junction, and upper cervical spine demonstrate no significant abnormalities.  The paranasal sinuses and mastoid air cells are clear.            X-Ray Abdomen AP 1 View (Final result) Result time:  03/23/17 13:31:07    Final result by Akira Garcia MD (03/23/17 13:31:07)    Impression:      As above.      Electronically signed by: AKIRA GARCIA MD  Date:     03/23/17  Time:    13:31     Narrative:    AP abdomen single view.  Comparison: None.    Suspected partially visualized enteric tube with distal tip seen at the level of the GE junction.  If this is in fact an enteric tube, recommend advancement.  Mild prominent gaseous distended small bowel loops.  No convincing evidence of obstruction.  No free air visualized.  Visualized lung bases are clear.  No unexpected metallic foreign body seen.            X-Ray Skull Ltd Less Than 4 Views (Final result) Result time:  03/23/17 13:29:07    Final result by Akira Garcia MD (03/23/17 13:29:07)    Impression:      As  above.      Electronically signed by: AKIRA GARCIA MD  Date:     03/23/17  Time:    13:29     Narrative:    Lateral skull single view.  Comparison: None.    Calvarium and cervical spine show no significant abnormalities.  No evidence of fracture.  No unexpected metallic foreign body seen.            X-Ray Chest 1 View (Final result) Result time:  03/22/17 20:14:05    Final result by Renaldo Miranda MD (03/22/17 20:14:05)    Impression:      No acute cardiopulmonary process.        Electronically signed by: RENALDO MIRANDA MD  Date:     03/22/17  Time:    20:14     Narrative:    Chest AP portable    Indication:Intubated    Comparison:None    Findings: Endotracheal tube tip lies approximately 4.3 cm above lars. The cardiomediastinal silhouette is not enlarged.  There is no pleural effusion.  The trachea is midline.  The lungs are symmetrically expanded bilaterally without evidence of acute parenchymal process. No large focal consolidation seen.  There is bandlike opacity projected over the left upper lung zone, likely external to patient.  There is no pneumothorax.  The osseous structures are remarkable for degenerative changes of the spine.             Assessment:     Silvana Lopes is a 47 y.o. female with:  Patient Active Problem List    Diagnosis Date Noted    Altered mental status 03/25/2017    Acute encephalopathy 03/22/2017        Plan:     Acute encephalopathy 2/2 psych medication overdose  -GCS 7 initially. Admits to psychiatric medication overdose (zyprexa, trileptal). No obvious evidence of metabolic or infectious causes. OSH CT head x 2 without bleed. Tox screen, Tylenol, and salicylate level negative at OSH. Ammonia level normal. EEG w/o seizure activity  -intubated for airway protection initially. was never in respiratory distress. Weaned off precedex and extubated  -poison control center contacted by OSH, recommended supportive care  -oxcarbazepine level elevated  -infectious workup unremarkable,  brain MRI negative  -neurology consult: just recommends psych consult, awaiting psych recs - patient has been medically cleared     Crohn's  -holding home medications for now    Bipolar disorder/schizophrenia  -holding home zyprexa, last invega injection 3/10/17  -intentional overdose, has had suicide attempts in the past  -psych consult; awaiting official note  -will avoid anti-psychotics if possible, possible PEC    Diet-regular  PPX-lovenox  Dispo - awaiting psych recs, likely needs inpatient psych placement, patient is medical cleared for this    Family Contact:  Latha Gonzalez (sister) - [phone]  Home phone - [phone]  Fiorella (sister) - [phone]    Code Status:     Elizabeth Best  [facility] Internal Medicine HO-III  [facility] Hospital Medicine Service B   [facility] Medicine Hospitalist Pager numbers:   [facility] Hospitalist Medicine Team A (Cyndy/Cristobal): [phone]  [facility] Hospitalist Medicine Team B (Farzana/Zacarias):  [phone]

## 2023-01-29 NOTE — PLAN OF CARE
Notified Dr. Adames's team about pt's urine output 30 ml/hr for the last 2 hours.    [Time Spent: ___ minutes] : I have spent [unfilled] minutes of time on the encounter.

## 2025-03-05 NOTE — PLAN OF CARE
[facility]    FACILITY TRANSFER ORDERS      Patient Name: Silvana Lopes  YOB: 1970    PCP: Primary Doctor No   PCP Address: None  PCP Phone Number: None  PCP Fax: None    Encounter Date: 03/27/2017    Admit to: [facility]    Vital Signs:  Routine    Diagnoses:   Active Hospital Problems    Diagnosis  POA    *Acute encephalopathy [G93.40]  Yes    Altered mental status [R41.82]  Yes      Resolved Hospital Problems    Diagnosis Date Resolved POA   No resolved problems to display.       Allergies:Review of patient's allergies indicates:  No Known Allergies    Diet: regular diet    Activities: Activity as tolerated    Nursing: N/A     Labs: N/A    CONSULTS:    N/A    MISCELLANEOUS CARE:  N/A    WOUND CARE ORDERS  None    Medications: Review discharge medications with patient and family and provide education.      Current Discharge Medication List      START taking these medications    Details   oxcarbazepine (TRILEPTAL) 300 MG Tab Take 1 tablet (300 mg total) by mouth 2 (two) times daily.  Qty: 30 tablet, Refills: 0      paliperidone (INVEGA) 3 MG TR24 Take 1 tablet (3 mg total) by mouth once daily.  Qty: 30 tablet, Refills: 0                  _________________________________  Jeanette Best MD  03/27/2017           BP rechecked at 09:05 212/106  Dr Frederick here with patient aware.  Dr Frederick  and Dr Soto spoke to patient.